# Patient Record
Sex: FEMALE | URBAN - METROPOLITAN AREA
[De-identification: names, ages, dates, MRNs, and addresses within clinical notes are randomized per-mention and may not be internally consistent; named-entity substitution may affect disease eponyms.]

---

## 2023-05-03 LAB
HEPATITIS B VIRUS SURFACE AB (MIU/ML) IN SERUM: <3.1 MIU/ML
MUMPS IGG ANTIBODY: POSITIVE
RUBELLA VIRUS IGG AB: POSITIVE
RUBEOLA IGG ANTIBODY: POSITIVE
SYPHILIS TOTAL AB: NONREACTIVE
VARICELLA ZOSTER IGG: POSITIVE

## 2023-05-05 LAB
NIL(NEG) CONTROL SPOT COUNT: NORMAL
PANEL A SPOT COUNT: 0
PANEL B SPOT COUNT: 0
POS CONTROL SPOT COUNT: NORMAL
T-SPOT. TB INTERPRETATION: NEGATIVE

## 2023-05-09 ENCOUNTER — APPOINTMENT (OUTPATIENT)
Dept: PRIMARY CARE | Facility: CLINIC | Age: 28
End: 2023-05-09
Payer: COMMERCIAL

## 2023-05-09 ENCOUNTER — CLINICAL SUPPORT (OUTPATIENT)
Dept: PRIMARY CARE | Facility: CLINIC | Age: 28
End: 2023-05-09
Payer: COMMERCIAL

## 2023-05-09 DIAGNOSIS — Z23 ENCOUNTER FOR IMMUNIZATION: Primary | ICD-10-CM

## 2023-05-09 PROCEDURE — 90472 IMMUNIZATION ADMIN EACH ADD: CPT | Performed by: INTERNAL MEDICINE

## 2023-05-09 PROCEDURE — 90471 IMMUNIZATION ADMIN: CPT | Performed by: INTERNAL MEDICINE

## 2023-05-09 PROCEDURE — 90743 HEPB VACC 2 DOSE ADOLESC IM: CPT | Performed by: INTERNAL MEDICINE

## 2023-05-09 PROCEDURE — 90715 TDAP VACCINE 7 YRS/> IM: CPT | Performed by: INTERNAL MEDICINE

## 2023-09-23 ENCOUNTER — LAB (OUTPATIENT)
Dept: LAB | Facility: LAB | Age: 28
End: 2023-09-23

## 2023-09-24 LAB — TOBACCO SCREEN, URINE: NEGATIVE

## 2023-10-02 ENCOUNTER — INITIAL PRENATAL (OUTPATIENT)
Dept: OBSTETRICS AND GYNECOLOGY | Facility: CLINIC | Age: 28
End: 2023-10-02
Payer: COMMERCIAL

## 2023-10-02 VITALS — DIASTOLIC BLOOD PRESSURE: 65 MMHG | SYSTOLIC BLOOD PRESSURE: 105 MMHG | WEIGHT: 123 LBS | BODY MASS INDEX: 21.79 KG/M2

## 2023-10-02 DIAGNOSIS — Z34.01 FIRST PREGNANCY, FIRST TRIMESTER (HHS-HCC): Primary | ICD-10-CM

## 2023-10-02 LAB — PREGNANCY TEST URINE, POC: POSITIVE

## 2023-10-02 PROCEDURE — 87800 DETECT AGNT MULT DNA DIREC: CPT | Performed by: NURSE PRACTITIONER

## 2023-10-02 PROCEDURE — 81025 URINE PREGNANCY TEST: CPT | Performed by: NURSE PRACTITIONER

## 2023-10-02 PROCEDURE — 0500F INITIAL PRENATAL CARE VISIT: CPT | Performed by: NURSE PRACTITIONER

## 2023-10-02 PROCEDURE — 88175 CYTOPATH C/V AUTO FLUID REDO: CPT | Mod: TC,GCY | Performed by: NURSE PRACTITIONER

## 2023-10-02 PROCEDURE — 87086 URINE CULTURE/COLONY COUNT: CPT | Performed by: NURSE PRACTITIONER

## 2023-10-02 RX ORDER — KETOCONAZOLE 20 MG/ML
SHAMPOO, SUSPENSION TOPICAL AS NEEDED
COMMUNITY
Start: 2022-11-11 | End: 2023-11-16

## 2023-10-02 RX ORDER — CLOBETASOL PROPIONATE 0.46 MG/ML
SOLUTION TOPICAL AS NEEDED
COMMUNITY
Start: 2022-11-11 | End: 2023-11-16

## 2023-10-02 ASSESSMENT — ENCOUNTER SYMPTOMS: FATIGUE: 1

## 2023-10-02 NOTE — PROGRESS NOTES
Subjective   Patient ID: Cara Quezada is a 28 y.o. female who presents for New Patient Visit (Confirmation of pregnancy).  HPI    Review of Systems   Constitutional:  Positive for fatigue.   Genitourinary:         Nausea   All other systems reviewed and are negative.      Objective   Physical Exam  Cardiovascular:      Rate and Rhythm: Normal rate and regular rhythm.   Pulmonary:      Effort: Pulmonary effort is normal.      Breath sounds: Normal breath sounds.   Genitourinary:     General: Normal vulva.      Vagina: Normal.      Cervix: Normal.      Uterus: Normal.       Adnexa: Right adnexa normal.      Rectum: Normal.   Neurological:      Mental Status: She is alert.         Assessment/Plan   Problem List Items Addressed This Visit    None  Visit Diagnoses         Codes    First pregnancy, first trimester    -  Primary Z34.01    Relevant Orders    CBC Anemia Panel With Reflex,Pregnancy    Hepatitis B Surface Antigen    HIV 1/2 Antigen/Antibody Screen with Reflex to Confirmation    Rubella Antibody, IgG    Syphilis Screen with Reflex    THINPREP PAP TEST    Type And Screen    Urine Culture    Vitamin D 25-Hydroxy,Total (for eval of Vitamin D levels)    US MAC OB imaging order    Follow Up In Obstetrics          New OB labs ordered.  All patient questions answered.  Home remedies to help with nausea discussed.  Return in 4 weeks for new OB visit.

## 2023-10-03 LAB — BACTERIA UR CULT: NO GROWTH

## 2023-10-04 LAB
C TRACH RRNA SPEC QL NAA+PROBE: NEGATIVE
N GONORRHOEA DNA SPEC QL PROBE+SIG AMP: NEGATIVE

## 2023-10-17 ENCOUNTER — LAB (OUTPATIENT)
Dept: LAB | Facility: LAB | Age: 28
End: 2023-10-17
Payer: COMMERCIAL

## 2023-10-17 DIAGNOSIS — Z34.01 FIRST PREGNANCY, FIRST TRIMESTER (HHS-HCC): ICD-10-CM

## 2023-10-17 LAB
ABO GROUP (TYPE) IN BLOOD: NORMAL
ANTIBODY SCREEN: NORMAL
CYTOLOGY CMNT CVX/VAG CYTO-IMP: NORMAL
ERYTHROCYTE [DISTWIDTH] IN BLOOD BY AUTOMATED COUNT: 12.3 % (ref 11.5–14.5)
HCT VFR BLD AUTO: 37.6 % (ref 36–46)
HGB BLD-MCNC: 12.8 G/DL (ref 12–16)
LAB AP HPV GENOTYPE QUESTION: NO
LAB AP HPV HR: NORMAL
LAB AP PAP ADDITIONAL TESTS: NORMAL
LABORATORY COMMENT REPORT: NORMAL
LMP START DATE: NORMAL
MCH RBC QN AUTO: 28.1 PG (ref 26–34)
MCHC RBC AUTO-ENTMCNC: 34 G/DL (ref 32–36)
MCV RBC AUTO: 83 FL (ref 80–100)
MENSTRUAL HX REPORTED: NORMAL
NRBC BLD-RTO: 0 /100 WBCS (ref 0–0)
PATH REPORT.TOTAL CANCER: NORMAL
PLATELET # BLD AUTO: 239 X10*3/UL (ref 150–450)
PMV BLD AUTO: 10.9 FL (ref 7.5–11.5)
RBC # BLD AUTO: 4.55 X10*6/UL (ref 4–5.2)
RH FACTOR (ANTIGEN D): NORMAL
WBC # BLD AUTO: 9.6 X10*3/UL (ref 4.4–11.3)

## 2023-10-17 PROCEDURE — 36415 COLL VENOUS BLD VENIPUNCTURE: CPT

## 2023-10-17 PROCEDURE — 82306 VITAMIN D 25 HYDROXY: CPT

## 2023-10-17 PROCEDURE — 85027 COMPLETE CBC AUTOMATED: CPT

## 2023-10-17 PROCEDURE — 86780 TREPONEMA PALLIDUM: CPT

## 2023-10-17 PROCEDURE — 86901 BLOOD TYPING SEROLOGIC RH(D): CPT

## 2023-10-17 PROCEDURE — 87340 HEPATITIS B SURFACE AG IA: CPT

## 2023-10-17 PROCEDURE — 86317 IMMUNOASSAY INFECTIOUS AGENT: CPT

## 2023-10-17 PROCEDURE — 86850 RBC ANTIBODY SCREEN: CPT

## 2023-10-17 PROCEDURE — 87389 HIV-1 AG W/HIV-1&-2 AB AG IA: CPT

## 2023-10-17 PROCEDURE — 86900 BLOOD TYPING SEROLOGIC ABO: CPT

## 2023-10-18 LAB
25(OH)D3 SERPL-MCNC: 30 NG/ML (ref 30–100)
HBV SURFACE AG SERPL QL IA: NONREACTIVE
HIV 1+2 AB+HIV1 P24 AG SERPL QL IA: NONREACTIVE
REFLEX ADDED, ANEMIA PANEL: NORMAL
RUBV IGG SERPL IA-ACNC: 1.6 IA
RUBV IGG SERPL QL IA: POSITIVE
T PALLIDUM AB SER QL: NONREACTIVE

## 2023-10-26 ENCOUNTER — APPOINTMENT (OUTPATIENT)
Dept: OBSTETRICS AND GYNECOLOGY | Facility: CLINIC | Age: 28
End: 2023-10-26
Payer: COMMERCIAL

## 2023-11-01 ENCOUNTER — APPOINTMENT (OUTPATIENT)
Dept: RADIOLOGY | Facility: CLINIC | Age: 28
End: 2023-11-01
Payer: COMMERCIAL

## 2023-11-02 ENCOUNTER — ANCILLARY PROCEDURE (OUTPATIENT)
Dept: RADIOLOGY | Facility: CLINIC | Age: 28
End: 2023-11-02
Payer: COMMERCIAL

## 2023-11-02 DIAGNOSIS — O26.891 OTHER SPECIFIED PREGNANCY RELATED CONDITIONS, FIRST TRIMESTER (HHS-HCC): ICD-10-CM

## 2023-11-02 DIAGNOSIS — Z34.01 FIRST PREGNANCY, FIRST TRIMESTER (HHS-HCC): ICD-10-CM

## 2023-11-02 PROCEDURE — 76801 OB US < 14 WKS SINGLE FETUS: CPT

## 2023-11-02 PROCEDURE — 76815 OB US LIMITED FETUS(S): CPT | Performed by: OBSTETRICS & GYNECOLOGY

## 2023-11-16 ENCOUNTER — APPOINTMENT (OUTPATIENT)
Dept: OBSTETRICS AND GYNECOLOGY | Facility: CLINIC | Age: 28
End: 2023-11-16

## 2023-11-16 ENCOUNTER — ROUTINE PRENATAL (OUTPATIENT)
Dept: OBSTETRICS AND GYNECOLOGY | Facility: CLINIC | Age: 28
End: 2023-11-16

## 2023-11-16 ENCOUNTER — LAB (OUTPATIENT)
Dept: LAB | Facility: LAB | Age: 28
End: 2023-11-16

## 2023-11-16 VITALS — WEIGHT: 128 LBS | SYSTOLIC BLOOD PRESSURE: 106 MMHG | BODY MASS INDEX: 22.67 KG/M2 | DIASTOLIC BLOOD PRESSURE: 64 MMHG

## 2023-11-16 DIAGNOSIS — Z34.01 FIRST PREGNANCY, FIRST TRIMESTER (HHS-HCC): ICD-10-CM

## 2023-11-16 DIAGNOSIS — Z34.01 FIRST PREGNANCY, FIRST TRIMESTER (HHS-HCC): Primary | ICD-10-CM

## 2023-11-16 PROCEDURE — 90686 IIV4 VACC NO PRSV 0.5 ML IM: CPT | Performed by: OBSTETRICS & GYNECOLOGY

## 2023-11-16 PROCEDURE — 90471 IMMUNIZATION ADMIN: CPT | Performed by: OBSTETRICS & GYNECOLOGY

## 2023-11-16 PROCEDURE — 0501F PRENATAL FLOW SHEET: CPT | Performed by: OBSTETRICS & GYNECOLOGY

## 2023-11-16 PROCEDURE — 36415 COLL VENOUS BLD VENIPUNCTURE: CPT

## 2023-11-16 NOTE — PROGRESS NOTES
Subjective   Patient ID [id number]   Cara Quezada is a 28 y.o.  at 15w1d with a working estimated date of delivery of 2024, by Last Menstrual Period who presents for a routine prenatal visit. She denies vaginal bleeding, leakage of fluid, decreased fetal movements, or contractions.  She previously saw Zandra Valenzuela for her initial OB visit.    Her pregnancy is complicated by:  Desires cfDNA screen    Objective   Physical Exam  Weight: 58.1 kg (128 lb)  Expected Total Weight Gain: Could not be calculated   Pregravid BMI: Could not be calculated  BP: 106/64  Fetal Heart Rate: 152            Lab Results   Component Value Date    HGB 12.8 10/17/2023    HCT 37.6 10/17/2023    ABO O 10/17/2023    HEPBSAG Nonreactive 10/17/2023         Assessment/Plan     Continue prenatal vitamin.  Flu vaccine  Follow up in 2 weeks for a routine prenatal visit.

## 2023-11-28 ENCOUNTER — PATIENT MESSAGE (OUTPATIENT)
Dept: OBSTETRICS AND GYNECOLOGY | Facility: HOSPITAL | Age: 28
End: 2023-11-28

## 2023-11-29 NOTE — TELEPHONE ENCOUNTER
From: Cara Quezada  To: Zandra Valenzuela, APRN-CNP  Sent: 11/28/2023 8:42 PM EST  Subject: Pregnancy Question    Hi, I wanted to know if it would be safe for [place] while I am pregnant next month. I am due May 8th and would be flying in late December until early January? Thanks.

## 2023-12-13 ENCOUNTER — APPOINTMENT (OUTPATIENT)
Dept: RADIOLOGY | Facility: CLINIC | Age: 28
End: 2023-12-13
Payer: COMMERCIAL

## 2023-12-13 PROBLEM — Z34.92: Status: ACTIVE | Noted: 2023-12-13

## 2023-12-14 ENCOUNTER — ANCILLARY PROCEDURE (OUTPATIENT)
Dept: RADIOLOGY | Facility: CLINIC | Age: 28
End: 2023-12-14
Payer: COMMERCIAL

## 2023-12-14 ENCOUNTER — ROUTINE PRENATAL (OUTPATIENT)
Dept: OBSTETRICS AND GYNECOLOGY | Facility: CLINIC | Age: 28
End: 2023-12-14
Payer: COMMERCIAL

## 2023-12-14 VITALS — SYSTOLIC BLOOD PRESSURE: 108 MMHG | BODY MASS INDEX: 22.85 KG/M2 | WEIGHT: 129 LBS | DIASTOLIC BLOOD PRESSURE: 68 MMHG

## 2023-12-14 DIAGNOSIS — Z34.92 ENCOUNTER FOR PREGNANCY RELATED EXAMINATION IN SECOND TRIMESTER (HHS-HCC): Primary | ICD-10-CM

## 2023-12-14 DIAGNOSIS — Z32.01 PREGNANCY TEST POSITIVE (HHS-HCC): ICD-10-CM

## 2023-12-14 PROCEDURE — 76811 OB US DETAILED SNGL FETUS: CPT | Performed by: OBSTETRICS & GYNECOLOGY

## 2023-12-14 PROCEDURE — 0501F PRENATAL FLOW SHEET: CPT | Performed by: OBSTETRICS & GYNECOLOGY

## 2023-12-14 PROCEDURE — 76811 OB US DETAILED SNGL FETUS: CPT

## 2024-01-11 ENCOUNTER — ROUTINE PRENATAL (OUTPATIENT)
Dept: OBSTETRICS AND GYNECOLOGY | Facility: CLINIC | Age: 29
End: 2024-01-11
Payer: COMMERCIAL

## 2024-01-11 ENCOUNTER — ANCILLARY PROCEDURE (OUTPATIENT)
Dept: RADIOLOGY | Facility: CLINIC | Age: 29
End: 2024-01-11
Payer: COMMERCIAL

## 2024-01-11 VITALS — DIASTOLIC BLOOD PRESSURE: 66 MMHG | WEIGHT: 131 LBS | SYSTOLIC BLOOD PRESSURE: 101 MMHG | BODY MASS INDEX: 23.21 KG/M2

## 2024-01-11 DIAGNOSIS — Z34.90 PREGNANT (HHS-HCC): ICD-10-CM

## 2024-01-11 DIAGNOSIS — Z34.92 ENCOUNTER FOR PREGNANCY RELATED EXAMINATION IN SECOND TRIMESTER (HHS-HCC): Primary | ICD-10-CM

## 2024-01-11 PROCEDURE — 0501F PRENATAL FLOW SHEET: CPT | Performed by: OBSTETRICS & GYNECOLOGY

## 2024-01-11 PROCEDURE — 76816 OB US FOLLOW-UP PER FETUS: CPT | Performed by: OBSTETRICS & GYNECOLOGY

## 2024-01-11 PROCEDURE — 76816 OB US FOLLOW-UP PER FETUS: CPT

## 2024-01-11 NOTE — PROGRESS NOTES
Ob Follow-up    SUBJECTIVE    HPI: Cara Quezada is a 28 y.o.  at 23w1d here for RPNV.  She denies vaginal bleeding, leakage of fluid, decreased fetal movements, and contractions.     OBJECTIVE  Visit Vitals  /66   Wt 59.4 kg (131 lb)   LMP 2023 (Exact Date)   BMI 23.21 kg/m²   OB Status Pregnant   Smoking Status Never   BSA 1.62 m²      See OB flowsheet  Incomplete anatomy US, follow up scheduled today    ASSESSMENT & PLAN    Cara Quezada is a 28 y.o.  at 23w1d here for the following concerns we addressed today:    Problem List Items Addressed This Visit          Ob-Gyn Problems    Encounter for pregnancy related examination in second trimester - Primary    Overview     Dating:   [x] Initial BMI:  21  [x] Prenatal Labs:  Reviewed  [x] Genetic Screening:  rrcfDNA screen  [x] Baby ASA:  Yes  [x] Anatomy US: Incomplete  [] 1hr GCT at 24-28wks:  [] Tdap (27-36wks):  [x] Flu Shot:  Done  [] COVID vaccine:   [] Rhogam (if Rh neg):   [] GBS at 36 wks:  [] Breastfeeding  [] Postpartum Birth control method:   [] 39 weeks discussion of IOL vs. Expectant management:  [] Mode of delivery:            Relevant Orders    CBC Anemia Panel With Reflex,Pregnancy    Glucose, 1 Hour Screen, Pregnancy    Syphilis Screen with Reflex         GCT next visit  RTC in 4 weeks      Mike Masters MD

## 2024-01-24 ENCOUNTER — PATIENT MESSAGE (OUTPATIENT)
Dept: OBSTETRICS AND GYNECOLOGY | Facility: CLINIC | Age: 29
End: 2024-01-24
Payer: COMMERCIAL

## 2024-01-24 NOTE — TELEPHONE ENCOUNTER
From: Cara Quezada  To: Mike Masters MD  Sent: 1/24/2024 12:38 PM EST  Subject: Antacid question     Hi Dr. Masters,    I wanted to know what else I can take besides tums for my heartburn? It is getting really bad and one of my friends said she takes something called famotedine and I was curious if there is something else I can try? Thanks!

## 2024-02-06 NOTE — PROGRESS NOTES
Ob Follow-up    SUBJECTIVE    HPI: Cara Quezada is a 28 y.o.  at 27w1d here for RPNV.  She denies vaginal bleeding, leakage of fluid, decreased fetal movements, and contractions.     OBJECTIVE  Visit Vitals  /71   Wt 60.3 kg (133 lb)   LMP 2023 (Exact Date)   BMI 23.56 kg/m²   OB Status Pregnant   Smoking Status Never   BSA 1.64 m²      See OB flowsheet  Incomplete anatomy US, follow up scheduled today    ASSESSMENT & PLAN    Cara Quezada is a 28 y.o.  at 27w1d here for the following concerns we addressed today:    Problem List Items Addressed This Visit          Ob-Gyn Problems    Encounter for pregnancy related examination in second trimester    Overview     Dating:   [x] Initial BMI:  21  [x] Prenatal Labs:  Reviewed  [x] Genetic Screening:  rrcfDNA screen  [x] Baby ASA:  Yes  [x] Anatomy US: Incomplete  [] 1hr GCT at 24-28wks:  [] Tdap (27-36wks):  [x] Flu Shot:  Done  [x] Rhogam (if Rh neg):  O positive  [] GBS at 36 wks:  [] Breastfeeding  [] Postpartum Birth control method:   [x] 39 weeks discussion of IOL vs. Expectant management:  Discussed ARRIVE trial  [x] Mode of delivery: Vaginal            Other Visit Diagnoses       Gastroesophageal reflux disease without esophagitis    -  Primary    Relevant Medications    omeprazole (PriLOSEC) 20 mg DR capsule          GCT today, tDap next visit  Planning unmedicated birth, recommend classes, hypnobirthing etc.  RTC in 4 weeks    Mike Masters MD

## 2024-02-08 ENCOUNTER — ROUTINE PRENATAL (OUTPATIENT)
Dept: OBSTETRICS AND GYNECOLOGY | Facility: CLINIC | Age: 29
End: 2024-02-08
Payer: COMMERCIAL

## 2024-02-08 VITALS — DIASTOLIC BLOOD PRESSURE: 71 MMHG | BODY MASS INDEX: 23.56 KG/M2 | SYSTOLIC BLOOD PRESSURE: 101 MMHG | WEIGHT: 133 LBS

## 2024-02-08 DIAGNOSIS — K21.9 GASTROESOPHAGEAL REFLUX DISEASE WITHOUT ESOPHAGITIS: Primary | ICD-10-CM

## 2024-02-08 DIAGNOSIS — Z34.92 ENCOUNTER FOR PREGNANCY RELATED EXAMINATION IN SECOND TRIMESTER (HHS-HCC): ICD-10-CM

## 2024-02-08 LAB
ERYTHROCYTE [DISTWIDTH] IN BLOOD BY AUTOMATED COUNT: 12.9 % (ref 11.5–14.5)
GLUCOSE 1H P 50 G GLC PO SERPL-MCNC: 89 MG/DL
HCT VFR BLD AUTO: 39.5 % (ref 36–46)
HGB BLD-MCNC: 12.9 G/DL (ref 12–16)
MCH RBC QN AUTO: 28.9 PG (ref 26–34)
MCHC RBC AUTO-ENTMCNC: 32.7 G/DL (ref 32–36)
MCV RBC AUTO: 89 FL (ref 80–100)
NRBC BLD-RTO: 0 /100 WBCS (ref 0–0)
PLATELET # BLD AUTO: 247 X10*3/UL (ref 150–450)
RBC # BLD AUTO: 4.46 X10*6/UL (ref 4–5.2)
REFLEX ADDED, ANEMIA PANEL: NORMAL
WBC # BLD AUTO: 9.9 X10*3/UL (ref 4.4–11.3)

## 2024-02-08 PROCEDURE — 0501F PRENATAL FLOW SHEET: CPT | Performed by: OBSTETRICS & GYNECOLOGY

## 2024-02-08 PROCEDURE — 82947 ASSAY GLUCOSE BLOOD QUANT: CPT

## 2024-02-08 PROCEDURE — 85027 COMPLETE CBC AUTOMATED: CPT

## 2024-02-08 PROCEDURE — 86780 TREPONEMA PALLIDUM: CPT

## 2024-02-08 PROCEDURE — 36415 COLL VENOUS BLD VENIPUNCTURE: CPT

## 2024-02-08 RX ORDER — OMEPRAZOLE 20 MG/1
20 CAPSULE, DELAYED RELEASE ORAL DAILY
Qty: 90 CAPSULE | Refills: 1 | Status: SHIPPED | OUTPATIENT
Start: 2024-02-08 | End: 2025-02-07

## 2024-02-08 RX ORDER — OMEPRAZOLE 20 MG/1
20 CAPSULE, DELAYED RELEASE ORAL DAILY
Qty: 90 CAPSULE | Refills: 1 | Status: SHIPPED | OUTPATIENT
Start: 2024-02-08 | End: 2024-02-08 | Stop reason: SDUPTHER

## 2024-02-08 ASSESSMENT — EDINBURGH POSTNATAL DEPRESSION SCALE (EPDS)
I HAVE FELT SCARED OR PANICKY FOR NO GOOD REASON: NO, NOT AT ALL
I HAVE BEEN SO UNHAPPY THAT I HAVE BEEN CRYING: NO, NEVER
I HAVE LOOKED FORWARD WITH ENJOYMENT TO THINGS: AS MUCH AS I EVER DID
TOTAL SCORE: 0
I HAVE BEEN ABLE TO LAUGH AND SEE THE FUNNY SIDE OF THINGS: AS MUCH AS I ALWAYS COULD
I HAVE BEEN ANXIOUS OR WORRIED FOR NO GOOD REASON: NO, NOT AT ALL
I HAVE BLAMED MYSELF UNNECESSARILY WHEN THINGS WENT WRONG: NO, NEVER
THE THOUGHT OF HARMING MYSELF HAS OCCURRED TO ME: NEVER
I HAVE BEEN SO UNHAPPY THAT I HAVE HAD DIFFICULTY SLEEPING: NOT AT ALL
THINGS HAVE BEEN GETTING ON TOP OF ME: NO, I HAVE BEEN COPING AS WELL AS EVER
I HAVE FELT SAD OR MISERABLE: NO, NOT AT ALL

## 2024-02-09 LAB — TREPONEMA PALLIDUM IGG+IGM AB [PRESENCE] IN SERUM OR PLASMA BY IMMUNOASSAY: NONREACTIVE

## 2024-02-29 ENCOUNTER — TELEPHONE (OUTPATIENT)
Dept: OBSTETRICS AND GYNECOLOGY | Facility: CLINIC | Age: 29
End: 2024-02-29
Payer: COMMERCIAL

## 2024-02-29 ENCOUNTER — HOSPITAL ENCOUNTER (OUTPATIENT)
Facility: HOSPITAL | Age: 29
End: 2024-02-29
Attending: OBSTETRICS & GYNECOLOGY | Admitting: OBSTETRICS & GYNECOLOGY
Payer: COMMERCIAL

## 2024-02-29 ENCOUNTER — HOSPITAL ENCOUNTER (OUTPATIENT)
Facility: HOSPITAL | Age: 29
Discharge: HOME | End: 2024-02-29
Attending: OBSTETRICS & GYNECOLOGY | Admitting: OBSTETRICS & GYNECOLOGY
Payer: COMMERCIAL

## 2024-02-29 VITALS
HEIGHT: 63 IN | RESPIRATION RATE: 17 BRPM | SYSTOLIC BLOOD PRESSURE: 100 MMHG | DIASTOLIC BLOOD PRESSURE: 61 MMHG | OXYGEN SATURATION: 97 % | WEIGHT: 144.4 LBS | BODY MASS INDEX: 25.59 KG/M2 | TEMPERATURE: 98.4 F | HEART RATE: 81 BPM

## 2024-02-29 PROCEDURE — 99214 OFFICE O/P EST MOD 30 MIN: CPT

## 2024-02-29 PROCEDURE — 59025 FETAL NON-STRESS TEST: CPT

## 2024-02-29 SDOH — SOCIAL STABILITY: SOCIAL INSECURITY: VERBAL ABUSE: DENIES

## 2024-02-29 SDOH — HEALTH STABILITY: MENTAL HEALTH: WISH TO BE DEAD (PAST 1 MONTH): NO

## 2024-02-29 SDOH — HEALTH STABILITY: MENTAL HEALTH: NON-SPECIFIC ACTIVE SUICIDAL THOUGHTS (PAST 1 MONTH): NO

## 2024-02-29 SDOH — HEALTH STABILITY: MENTAL HEALTH: WERE YOU ABLE TO COMPLETE ALL THE BEHAVIORAL HEALTH SCREENINGS?: YES

## 2024-02-29 SDOH — SOCIAL STABILITY: SOCIAL INSECURITY: PHYSICAL ABUSE: DENIES

## 2024-02-29 SDOH — SOCIAL STABILITY: SOCIAL INSECURITY: ARE YOU OR HAVE YOU BEEN THREATENED OR ABUSED PHYSICALLY, EMOTIONALLY, OR SEXUALLY BY ANYONE?: NO

## 2024-02-29 SDOH — SOCIAL STABILITY: SOCIAL INSECURITY: HAVE YOU HAD THOUGHTS OF HARMING ANYONE ELSE?: NO

## 2024-02-29 SDOH — ECONOMIC STABILITY: HOUSING INSECURITY: DO YOU FEEL UNSAFE GOING BACK TO THE PLACE WHERE YOU ARE LIVING?: NO

## 2024-02-29 SDOH — SOCIAL STABILITY: SOCIAL INSECURITY: ARE THERE ANY APPARENT SIGNS OF INJURIES/BEHAVIORS THAT COULD BE RELATED TO ABUSE/NEGLECT?: NO

## 2024-02-29 SDOH — SOCIAL STABILITY: SOCIAL INSECURITY: ABUSE SCREEN: ADULT

## 2024-02-29 SDOH — HEALTH STABILITY: MENTAL HEALTH: SUICIDAL BEHAVIOR (LIFETIME): NO

## 2024-02-29 SDOH — SOCIAL STABILITY: SOCIAL INSECURITY: HAS ANYONE EVER THREATENED TO HURT YOUR FAMILY OR YOUR PETS?: NO

## 2024-02-29 SDOH — SOCIAL STABILITY: SOCIAL INSECURITY: DO YOU FEEL ANYONE HAS EXPLOITED OR TAKEN ADVANTAGE OF YOU FINANCIALLY OR OF YOUR PERSONAL PROPERTY?: NO

## 2024-02-29 SDOH — HEALTH STABILITY: MENTAL HEALTH: HAVE YOU USED ANY SUBSTANCES (CANABIS, COCAINE, HEROIN, HALLUCINOGENS, INHALANTS, ETC.) IN THE PAST 12 MONTHS?: NO

## 2024-02-29 SDOH — HEALTH STABILITY: MENTAL HEALTH: HAVE YOU USED ANY PRESCRIPTION DRUGS OTHER THAN PRESCRIBED IN THE PAST 12 MONTHS?: NO

## 2024-02-29 SDOH — SOCIAL STABILITY: SOCIAL INSECURITY: DOES ANYONE TRY TO KEEP YOU FROM HAVING/CONTACTING OTHER FRIENDS OR DOING THINGS OUTSIDE YOUR HOME?: NO

## 2024-02-29 ASSESSMENT — LIFESTYLE VARIABLES
HOW OFTEN DO YOU HAVE 6 OR MORE DRINKS ON ONE OCCASION: NEVER
SKIP TO QUESTIONS 9-10: 1
HOW MANY STANDARD DRINKS CONTAINING ALCOHOL DO YOU HAVE ON A TYPICAL DAY: PATIENT DOES NOT DRINK
AUDIT-C TOTAL SCORE: 0
AUDIT-C TOTAL SCORE: 0
HOW OFTEN DO YOU HAVE A DRINK CONTAINING ALCOHOL: NEVER

## 2024-02-29 ASSESSMENT — PATIENT HEALTH QUESTIONNAIRE - PHQ9
2. FEELING DOWN, DEPRESSED OR HOPELESS: NOT AT ALL
SUM OF ALL RESPONSES TO PHQ9 QUESTIONS 1 & 2: 0
1. LITTLE INTEREST OR PLEASURE IN DOING THINGS: NOT AT ALL

## 2024-02-29 NOTE — TELEPHONE ENCOUNTER
Regarding: Baby kick frequency  Contact: [phone]  ----- Message from Arabella Romano MA sent at 2/29/2024  8:29 AM EST -----       ----- Message from Cara Quezada to Mike Masters MD sent at 2/29/2024  8:28 AM -----   Hi Dr. Masters,    I wanted to ask if it is normal that i have felt less kicks, both frequency and strength over the past couple days.  I do feel kicking, just not the same as it was?

## 2024-02-29 NOTE — TELEPHONE ENCOUNTER
contacted pt  name and  verified  Pt's  called in due to wife being at work  They are reporting decreased fetal movement in both strength and frequency  No other symptoms of loss of fluid or bleeding reported  Pt advised to go to L&D for evaluation at [facility] due to gestational age  Awaiting call from pt for more information        contacted pt  name and  verified  Spoke with pt states that she is having decreased movement in strength and frequency  No loss of fluid, odor or bleeding, some mild cramping  Gave pt the recommendation to go to [facility] and Delivery to get evaluated  pt verbalized understanding  no further questions or concerns at this time

## 2024-02-29 NOTE — TELEPHONE ENCOUNTER
contacted pt  name and  verified  Spoke with pt states that she is having decreased movement in strength and frequency  No loss of fluid, odor or bleeding, some mild cramping  Gave pt the recommendation to go to main campus Labor and Delivery to get evaluated  pt verbalized understanding  no further questions or concerns at this time

## 2024-02-29 NOTE — TELEPHONE ENCOUNTER
contacted pt  name and  verified  Pt's  called in due to wife being at work  They are reporting decreased fetal movement in both strength and frequency  No other symptoms of loss of fluid or bleeding reported  Pt advised to go to L&D for evaluation at [facility] due to gestational age  Awaiting call from pt for more information

## 2024-03-01 NOTE — H&P
Obstetrical Triage History and Physical     Nofar Pilar is a 28 y.o. . 30w1d seen in triage for dFM.     Chief Complaint: Decreased fetal movement    Assessment/Plan    Decreased FM  - Has been feeling fetal movement but decrease in intensity  - Has felt FM since arriving to triage  - NST appropriate for gestational age, reassuring tracing  - Education provided regarding monitoring FM  - Discussed that cEFM is reassuring regarding fetal well-being  - Return precautions reviewed    IUP at 30w1d  - NST appropriate for gestational age   - Continue routine prenatal care  - Next appt  with Dr. Masters    Maternal Well-being  - Vital signs stable and WNL  - All questions and concerns addressed    Dispo:  - appropriate for discharge to home, patient comfortable with this   - follow-up with Dr. Masters on  for routine prenatal visit   - return precautions reviewed     Discussed with Dr. Braxton, tracing reviewed, agrees with plan to d/c home.   DAVION LemusC        Active Problems:  There are no active Hospital Problems.      Pregnancy Problems (from 10/02/23 to present)       Problem Noted Resolved    Encounter for pregnancy related examination in second trimester 2023 by Mike Masters MD No    Priority:  Medium      Overview Addendum 2024 12:22 PM by Mike Masters MD     Dating:   [x] Initial BMI:  21  [x] Prenatal Labs:  Reviewed  [x] Genetic Screening:  rrcfDNA screen  [x] Baby ASA:  Yes  [x] Anatomy US: Incomplete  [x] 1hr GCT at 24-28wks: Normal  [] Tdap (27-36wks):  [x] Flu Shot:  Done  [x] Rhogam (if Rh neg):  O positive  [] GBS at 36 wks:  [] Breastfeeding  [] Postpartum Birth control method:   [x] 39 weeks discussion of IOL vs. Expectant management:  Discussed ARRIVE trial  [x] Mode of delivery: Vaginal                 Subjective   Nofar is here complaining of decreased fetal movement.  Denies vaginal bleeding., Denies contractions., Denies leaking of fluid.      Patient states for the  past few days she has felt a change in FM. She states the movements feel softer and less intense. She states she does not know how to evaluate fetal movement at home or if/how to do kick counts. Would like guidance regarding monitoring FM at home. She endorses FM since arriving to triage. She denies other concerns.     Obstetrical History   OB History    Para Term  AB Living   1 0           SAB IAB Ectopic Multiple Live Births                  # Outcome Date GA Lbr Anthony/2nd Weight Sex Delivery Anes PTL Lv   1 Current                Past Medical History  Past Medical History:   Diagnosis Date    Psoriasis         Past Surgical History   Past Surgical History:   Procedure Laterality Date    APPENDECTOMY      OTHER SURGICAL HISTORY  12/15/2022    Appendectomy       Social History  Social History     Tobacco Use    Smoking status: Never    Smokeless tobacco: Never   Substance Use Topics    Alcohol use: Never     Substance and Sexual Activity   Drug Use Never       Allergies  Patient has no known allergies.     Medications  Medications Prior to Admission   Medication Sig Dispense Refill Last Dose    omeprazole (PriLOSEC) 20 mg DR capsule Take 1 capsule (20 mg) by mouth once daily. Do not crush or chew. 90 capsule 1     PRENATAL 2-IRON-FOLIC ACID-OM3 ORAL Take by mouth.          Objective    Last Vitals  Temp Pulse Resp BP MAP O2 Sat   36.9 °C (98.4 °F) 83 16 110/61   97 %     Physical Examination  GENERAL: Examination reveals a well developed, well nourished, gravid female in no acute distress. She is alert and cooperative.  LUNGS: Normal respiratory effort  ABDOMEN: soft, gravid, nontender, nondistended, no abnormal masses, no epigastric pain  NEUROLOGICAL: alert, oriented, normal speech, no focal findings or movement disorder noted  PSYCHOLOGICAL: awake and alert; oriented to person, place, and time    Non-Stress Test   Baseline Fetal Heart Rate for Non-Stress Test: 135 BPM  Variability in Waveform for  Non-Stress Test: Moderate  Accelerations in Non-Stress Test: Yes, greater than/equal to 15 bpm, lasting at least 15 seconds  Decelerations in Non-Stress Test: None  Contractions in Non-Stress Test: Not present (rare irritability)  Acoustic Stimulator for Non-Stress Test: No  Interpretation of Non-Stress Test   Interpretation of Non-Stress Test: Reactive     Lab Review  Labs in chart were reviewed.

## 2024-03-06 NOTE — PROGRESS NOTES
Ob Follow-up    SUBJECTIVE    HPI: Cara Quezada is a 28 y.o.  at 31w1d here for RPNV.  She denies vaginal bleeding, leakage of fluid, decreased fetal movements, and contractions.  Seen in triage for decreased fetal movement since last visit with reassuring testing.     OBJECTIVE  Visit Vitals  /67   Wt 64 kg (141 lb)   LMP 2023 (Exact Date)   BMI 24.98 kg/m²   OB Status Pregnant   Smoking Status Never   BSA 1.69 m²      See OB flowsheet    ASSESSMENT & PLAN    Cara Quezada is a 28 y.o.  at 31w1d here for the following concerns we addressed today:    Problem List Items Addressed This Visit          Ob-Gyn Problems    Encounter for pregnancy related examination in second trimester - Primary    Overview     Dating:   [x] Initial BMI:  21  [x] Prenatal Labs:  Reviewed  [x] Genetic Screening:  rrcfDNA screen  [x] Baby ASA:  Yes  [x] Anatomy US: Normal  [x] 1hr GCT at 24-28wks: Normal  [x] Tdap (27-36wks):  Done  [x] Flu Shot:  Done  [x] Rhogam (if Rh neg):  O positive  [] GBS at 36 wks:  [] Breastfeeding  [] Postpartum Birth control method:   [x] 39 weeks discussion of IOL vs. Expectant management:  Discussed ARRIVE trial  [x] Mode of delivery: Vaginal              S/p tDap today  Planning unmedicated birth, recommend classes, hypnobirthing etc.  Doing online course currently.  RTC in 2 weeks    Mike Masters MD

## 2024-03-07 ENCOUNTER — ROUTINE PRENATAL (OUTPATIENT)
Dept: OBSTETRICS AND GYNECOLOGY | Facility: CLINIC | Age: 29
End: 2024-03-07
Payer: COMMERCIAL

## 2024-03-07 VITALS — SYSTOLIC BLOOD PRESSURE: 108 MMHG | DIASTOLIC BLOOD PRESSURE: 67 MMHG | BODY MASS INDEX: 24.98 KG/M2 | WEIGHT: 141 LBS

## 2024-03-07 DIAGNOSIS — Z34.92 ENCOUNTER FOR PREGNANCY RELATED EXAMINATION IN SECOND TRIMESTER (HHS-HCC): Primary | ICD-10-CM

## 2024-03-07 PROCEDURE — 90715 TDAP VACCINE 7 YRS/> IM: CPT | Performed by: OBSTETRICS & GYNECOLOGY

## 2024-03-07 PROCEDURE — 90471 IMMUNIZATION ADMIN: CPT | Performed by: OBSTETRICS & GYNECOLOGY

## 2024-03-07 PROCEDURE — 0501F PRENATAL FLOW SHEET: CPT | Performed by: OBSTETRICS & GYNECOLOGY

## 2024-03-19 NOTE — PROGRESS NOTES
Ob Follow-up    SUBJECTIVE    HPI: Cara Monsivais is a 28 y.o.  at 33w here for RPNV.  She denies vaginal bleeding, leakage of fluid, decreased fetal movements, and contractions.       OBJECTIVE  Visit Vitals  /66   Wt 64 kg (141 lb)   LMP 2023 (Exact Date)   BMI 24.98 kg/m²   OB Status Pregnant   Smoking Status Never   BSA 1.69 m²      See OB flowsheet    ASSESSMENT & PLAN    Cara Monsivais is a 28 y.o.  at 33w here for the following concerns we addressed today:    Problem List Items Addressed This Visit          Ob-Gyn Problems    Encounter for pregnancy related examination in second trimester - Primary    Overview     Dating:   [x] Initial BMI:  21  [x] Prenatal Labs:  Reviewed  [x] Genetic Screening:  rrcfDNA screen  [x] Baby ASA:  Yes  [x] Anatomy US: Normal  [x] 1hr GCT at 24-28wks: Normal  [x] Tdap (27-36wks):  Done  [x] Flu Shot:  Done  [x] Rhogam (if Rh neg):  O positive  [] GBS at 36 wks:  [x] Breastfeeding:  Yes  [] Postpartum Birth control method:   [x] 39 weeks discussion of IOL vs. Expectant management:  Discussed ARRIVE trial, hoping for spontaneous labor  [x] Mode of delivery: Vaginal            Planning unmedicated birth   -Doing online hypnobirthing course  GBS next visit  Hoping for spontaneous labor  RTC in 2 weeks    Mike Masters MD

## 2024-03-20 ENCOUNTER — ROUTINE PRENATAL (OUTPATIENT)
Dept: OBSTETRICS AND GYNECOLOGY | Facility: CLINIC | Age: 29
End: 2024-03-20
Payer: COMMERCIAL

## 2024-03-20 VITALS — BODY MASS INDEX: 24.98 KG/M2 | WEIGHT: 141 LBS | DIASTOLIC BLOOD PRESSURE: 66 MMHG | SYSTOLIC BLOOD PRESSURE: 103 MMHG

## 2024-03-20 DIAGNOSIS — Z34.92 ENCOUNTER FOR PREGNANCY RELATED EXAMINATION IN SECOND TRIMESTER (HHS-HCC): Primary | ICD-10-CM

## 2024-03-20 PROCEDURE — 0501F PRENATAL FLOW SHEET: CPT | Performed by: OBSTETRICS & GYNECOLOGY

## 2024-04-09 NOTE — PROGRESS NOTES
Ob Follow-up    SUBJECTIVE    HPI: Cara Monsivais is a 29 y.o.  at 36w1d here for RPNV.  She denies vaginal bleeding, leakage of fluid, decreased fetal movements, and contractions.       OBJECTIVE  Visit Vitals  /72   Wt 66.2 kg (146 lb)   LMP 2023 (Exact Date)   BMI 25.86 kg/m²   OB Status Pregnant   Smoking Status Never   BSA 1.72 m²      See OB flowsheet    ASSESSMENT & PLAN    Cara Monsivais is a 29 y.o.  at 36w1d here for the following concerns we addressed today:    Problem List Items Addressed This Visit          Ob-Gyn Problems    Encounter for pregnancy related examination in second trimester - Primary    Overview     Dating:   [x] Initial BMI:  21  [x] Prenatal Labs:  Reviewed  [x] Genetic Screening:  rrcfDNA screen  [x] Baby ASA:  Yes  [x] Anatomy US: Normal  [x] 1hr GCT at 24-28wks: Normal  [x] Tdap (27-36wks):  Done  [x] Flu Shot:  Done  [x] Rhogam (if Rh neg):  O positive  [] GBS at 36 wks:  [x] Breastfeeding:  Yes  [] Postpartum Birth control method:   [x] 39 weeks discussion of IOL vs. Expectant management:  Discussed ARRIVE trial, hoping for spontaneous labor  [x] Mode of delivery: Vaginal            Planning unmedicated birth   -Doing online hypnobirthing course  GBS today  Hoping for spontaneous labor  Labor and preeclampsia precautions discussed  RTC in 1 week    Mike Masters MD

## 2024-04-11 ENCOUNTER — ROUTINE PRENATAL (OUTPATIENT)
Dept: OBSTETRICS AND GYNECOLOGY | Facility: CLINIC | Age: 29
End: 2024-04-11
Payer: COMMERCIAL

## 2024-04-11 VITALS — WEIGHT: 146 LBS | DIASTOLIC BLOOD PRESSURE: 72 MMHG | SYSTOLIC BLOOD PRESSURE: 105 MMHG | BODY MASS INDEX: 25.86 KG/M2

## 2024-04-11 DIAGNOSIS — Z34.92 ENCOUNTER FOR PREGNANCY RELATED EXAMINATION IN SECOND TRIMESTER (HHS-HCC): Primary | ICD-10-CM

## 2024-04-11 PROCEDURE — 87081 CULTURE SCREEN ONLY: CPT

## 2024-04-11 PROCEDURE — 0501F PRENATAL FLOW SHEET: CPT | Performed by: OBSTETRICS & GYNECOLOGY

## 2024-04-14 LAB — GP B STREP GENITAL QL CULT: NORMAL

## 2024-04-17 NOTE — PROGRESS NOTES
Ob Follow-up    SUBJECTIVE    HPI: Cara Monsivais is a 29 y.o.  at 37w1d here for RPNV.  She denies vaginal bleeding, leakage of fluid, decreased fetal movements, and contractions.       OBJECTIVE  Visit Vitals  BP 93/59   Wt 67.6 kg (149 lb)   LMP 2023 (Exact Date)   BMI 26.39 kg/m²   OB Status Pregnant   Smoking Status Never   BSA 1.73 m²      See OB flowsheet    ASSESSMENT & PLAN    Cara Monsivais is a 29 y.o.  at 37w1d here for the following concerns we addressed today:    Problem List Items Addressed This Visit          Ob-Gyn Problems    Encounter for pregnancy related examination in second trimester ([facility]) - Primary    Overview     Dating:   [x] Initial BMI:  21  [x] Prenatal Labs:  Reviewed  [x] Genetic Screening:  rrcfDNA screen  [x] Baby ASA:  Yes  [x] Anatomy US: Normal  [x] 1hr GCT at 24-28wks: Normal  [x] Tdap (27-36wks):  Done  [x] Flu Shot:  Done  [x] Rhogam (if Rh neg):  O positive  [x] GBS at 36 wks:  Negative  [x] Breastfeeding:  Yes  [] Postpartum Birth control method:   [x] 39 weeks discussion of IOL vs. Expectant management:  Discussed ARRIVE trial, hoping for spontaneous labor  [x] Labor:  Planning unmedicated birth, took hypnobirthing classes  [x] Mode of delivery: Vaginal            Planning unmedicated birth  -Hoping to use tub, reviewed guidelines  GBS negative  Hoping for spontaneous labor  Labor and preeclampsia precautions discussed  RTC in 1 week    Mike Masters MD

## 2024-04-18 ENCOUNTER — ROUTINE PRENATAL (OUTPATIENT)
Dept: OBSTETRICS AND GYNECOLOGY | Facility: CLINIC | Age: 29
End: 2024-04-18
Payer: COMMERCIAL

## 2024-04-18 VITALS — WEIGHT: 149 LBS | DIASTOLIC BLOOD PRESSURE: 59 MMHG | SYSTOLIC BLOOD PRESSURE: 93 MMHG | BODY MASS INDEX: 26.39 KG/M2

## 2024-04-18 DIAGNOSIS — Z34.92 ENCOUNTER FOR PREGNANCY RELATED EXAMINATION IN SECOND TRIMESTER (HHS-HCC): Primary | ICD-10-CM

## 2024-04-18 PROCEDURE — 0501F PRENATAL FLOW SHEET: CPT | Performed by: OBSTETRICS & GYNECOLOGY

## 2024-04-23 NOTE — PROGRESS NOTES
Ob Follow-up    SUBJECTIVE    HPI: Cara Monsivais is a 29 y.o.  at 38w1d here for RPNV.  She denies vaginal bleeding, leakage of fluid, decreased fetal movements, and contractions.       OBJECTIVE  Visit Vitals  BP 96/67   Wt 66.7 kg (147 lb)   LMP 2023 (Exact Date)   BMI 26.04 kg/m²   OB Status Pregnant   Smoking Status Never   BSA 1.72 m²      See OB flowsheet    ASSESSMENT & PLAN    Cara Monsivais is a 29 y.o.  at 38w1d here for the following concerns we addressed today:    Problem List Items Addressed This Visit          Ob-Gyn Problems    Encounter for pregnancy related examination in second trimester ([facility]) - Primary    Overview     Dating:   [x] Initial BMI:  21  [x] Prenatal Labs:  Reviewed  [x] Genetic Screening:  rrcfDNA screen  [x] Baby ASA:  Yes  [x] Anatomy US: Normal  [x] 1hr GCT at 24-28wks: Normal  [x] Tdap (27-36wks):  Done  [x] Flu Shot:  Done  [x] Rhogam (if Rh neg):  O positive  [x] GBS at 36 wks:  Negative  [x] Breastfeeding:  Yes  [] Postpartum Birth control method:   [x] 39 weeks discussion of IOL vs. Expectant management:  Discussed ARRIVE trial, hoping for spontaneous labor  [x] Labor:  Planning unmedicated birth, took hypnobirthing classes  [x] Mode of delivery: Vaginal              Planning unmedicated birth  -Aware of water immersion guidelines  GBS negative  Hoping for spontaneous labor  Labor and preeclampsia precautions discussed  RTC in 1 week    Mike Masters MD

## 2024-04-25 ENCOUNTER — ROUTINE PRENATAL (OUTPATIENT)
Dept: OBSTETRICS AND GYNECOLOGY | Facility: CLINIC | Age: 29
End: 2024-04-25
Payer: COMMERCIAL

## 2024-04-25 VITALS — SYSTOLIC BLOOD PRESSURE: 96 MMHG | WEIGHT: 147 LBS | BODY MASS INDEX: 26.04 KG/M2 | DIASTOLIC BLOOD PRESSURE: 67 MMHG

## 2024-04-25 DIAGNOSIS — Z34.93 ENCOUNTER FOR PREGNANCY RELATED EXAMINATION IN THIRD TRIMESTER (HHS-HCC): Primary | ICD-10-CM

## 2024-04-25 PROCEDURE — 0501F PRENATAL FLOW SHEET: CPT | Performed by: OBSTETRICS & GYNECOLOGY

## 2024-05-01 NOTE — PROGRESS NOTES
Ob Follow-up    SUBJECTIVE    HPI: Cara Monsivais is a 29 y.o.  at 39w1d here for RPNV.  She denies vaginal bleeding, leakage of fluid, decreased fetal movements, and contractions.       OBJECTIVE  Visit Vitals  /70   Wt 65.8 kg (145 lb)   LMP 2023 (Exact Date)   BMI 25.69 kg/m²   OB Status Pregnant   Smoking Status Never   BSA 1.71 m²      See OB flowsheet    ASSESSMENT & PLAN    Cara Monsivais is a 29 y.o.  at 39w1d here for the following concerns we addressed today:    Problem List Items Addressed This Visit          Ob-Gyn Problems    Encounter for pregnancy related examination in second trimester ([facility]) - Primary    Overview     Dating:   [x] Initial BMI:  21  [x] Prenatal Labs:  Reviewed  [x] Genetic Screening:  rrcfDNA screen  [x] Baby ASA:  Yes  [x] Anatomy US: Normal  [x] 1hr GCT at 24-28wks: Normal  [x] Tdap (27-36wks):  Done  [x] Flu Shot:  Done  [x] Rhogam (if Rh neg):  O positive  [x] GBS at 36 wks:  Negative  [x] Breastfeeding:  Yes  [] Postpartum Birth control method:   [x] 39 weeks discussion of IOL vs. Expectant management:  Discussed ARRIVE trial, hoping for spontaneous labor  [x] Labor:  Planning unmedicated birth, took hypnobirthing classes, aware of water immersion guidelines  [x] Mode of delivery: Vaginal            Aunt just passed away, her mom is getting here 5/7  Hoping for spontaneous labor, interested in membrane stripping next week  Labor and preeclampsia precautions discussed  RTC in 1 week    Mike Masters MD

## 2024-05-02 ENCOUNTER — ROUTINE PRENATAL (OUTPATIENT)
Dept: OBSTETRICS AND GYNECOLOGY | Facility: CLINIC | Age: 29
End: 2024-05-02
Payer: COMMERCIAL

## 2024-05-02 VITALS — DIASTOLIC BLOOD PRESSURE: 70 MMHG | BODY MASS INDEX: 25.69 KG/M2 | SYSTOLIC BLOOD PRESSURE: 100 MMHG | WEIGHT: 145 LBS

## 2024-05-02 DIAGNOSIS — Z34.92 ENCOUNTER FOR PREGNANCY RELATED EXAMINATION IN SECOND TRIMESTER (HHS-HCC): Primary | ICD-10-CM

## 2024-05-02 PROCEDURE — 0501F PRENATAL FLOW SHEET: CPT | Performed by: OBSTETRICS & GYNECOLOGY

## 2024-05-07 NOTE — PROGRESS NOTES
Ob Follow-up    SUBJECTIVE    HPI: Cara Monsivais is a 29 y.o.  at 40w1d here for RPNV.  She denies vaginal bleeding, leakage of fluid, decreased fetal movements, and contractions.       OBJECTIVE  Visit Vitals  /71   Wt 67.1 kg (148 lb)   LMP 2023 (Exact Date)   BMI 26.22 kg/m²   OB Status Pregnant   Smoking Status Never   BSA 1.73 m²      See OB flowsheet    ASSESSMENT & PLAN    Cara Monsivais is a 29 y.o.  at 40w1d here for the following concerns we addressed today:    Problem List Items Addressed This Visit          Ob-Gyn Problems    Encounter for pregnancy related examination in second trimester ([facility]) - Primary    Overview     Dating:   [x] Initial BMI:  21  [x] Prenatal Labs:  Reviewed  [x] Genetic Screening:  rrcfDNA screen  [x] Baby ASA:  Yes  [x] Anatomy US: Normal  [x] 1hr GCT at 24-28wks: Normal  [x] Tdap (27-36wks):  Done  [x] Flu Shot:  Done  [x] Rhogam (if Rh neg):  O positive  [x] GBS at 36 wks:  Negative  [x] Breastfeeding:  Yes  [] Postpartum Birth control method:   [x] 39 weeks discussion of IOL vs. Expectant management:  Discussed ARRIVE trial, hoping for spontaneous labor  [x] Labor:  Planning unmedicated birth, took hypnobirthing classes, aware of water immersion guidelines  [x] Mode of delivery: Vaginal              Extensive discussion regarding normal course of induction in detail including cytotec, CRB, pitocin, expected duration 24-48 hours.  Hospital coverage again reviewed.  Many, many questions answered.    Discussed my recommendation for induction at 41w and increased risk of still birth.  [facility] would like to schedule induction next week on Wednesday.  Labor and preeclampsia precautions discussed.    Mike Masters MD

## 2024-05-09 ENCOUNTER — ROUTINE PRENATAL (OUTPATIENT)
Dept: OBSTETRICS AND GYNECOLOGY | Facility: CLINIC | Age: 29
End: 2024-05-09
Payer: COMMERCIAL

## 2024-05-09 VITALS — DIASTOLIC BLOOD PRESSURE: 71 MMHG | SYSTOLIC BLOOD PRESSURE: 106 MMHG | BODY MASS INDEX: 26.22 KG/M2 | WEIGHT: 148 LBS

## 2024-05-09 DIAGNOSIS — Z34.92 ENCOUNTER FOR PREGNANCY RELATED EXAMINATION IN SECOND TRIMESTER (HHS-HCC): Primary | ICD-10-CM

## 2024-05-09 PROCEDURE — 0501F PRENATAL FLOW SHEET: CPT | Performed by: OBSTETRICS & GYNECOLOGY

## 2024-05-12 ENCOUNTER — HOSPITAL ENCOUNTER (INPATIENT)
Facility: HOSPITAL | Age: 29
LOS: 2 days | Discharge: HOME | End: 2024-05-14
Attending: OBSTETRICS & GYNECOLOGY | Admitting: OBSTETRICS & GYNECOLOGY
Payer: COMMERCIAL

## 2024-05-12 PROBLEM — Z3A.40 40 WEEKS GESTATION OF PREGNANCY (HHS-HCC): Status: ACTIVE | Noted: 2024-05-12

## 2024-05-12 PROBLEM — Z3A.39 39 WEEKS GESTATION OF PREGNANCY (HHS-HCC): Status: ACTIVE | Noted: 2024-05-12

## 2024-05-12 LAB
ABO GROUP (TYPE) IN BLOOD: NORMAL
ANTIBODY SCREEN: NORMAL
ERYTHROCYTE [DISTWIDTH] IN BLOOD BY AUTOMATED COUNT: 12.5 % (ref 11.5–14.5)
HCT VFR BLD AUTO: 38.3 % (ref 36–46)
HGB BLD-MCNC: 13.3 G/DL (ref 12–16)
MCH RBC QN AUTO: 29 PG (ref 26–34)
MCHC RBC AUTO-ENTMCNC: 34.7 G/DL (ref 32–36)
MCV RBC AUTO: 84 FL (ref 80–100)
NRBC BLD-RTO: 0 /100 WBCS (ref 0–0)
PLATELET # BLD AUTO: 180 X10*3/UL (ref 150–450)
RBC # BLD AUTO: 4.58 X10*6/UL (ref 4–5.2)
RH FACTOR (ANTIGEN D): NORMAL
WBC # BLD AUTO: 12.4 X10*3/UL (ref 4.4–11.3)

## 2024-05-12 PROCEDURE — 7210000002 HC LABOR PER HOUR

## 2024-05-12 PROCEDURE — 2500000004 HC RX 250 GENERAL PHARMACY W/ HCPCS (ALT 636 FOR OP/ED): Performed by: OBSTETRICS & GYNECOLOGY

## 2024-05-12 PROCEDURE — 85027 COMPLETE CBC AUTOMATED: CPT | Performed by: OBSTETRICS & GYNECOLOGY

## 2024-05-12 PROCEDURE — 2500000005 HC RX 250 GENERAL PHARMACY W/O HCPCS: Performed by: OBSTETRICS & GYNECOLOGY

## 2024-05-12 PROCEDURE — 86780 TREPONEMA PALLIDUM: CPT | Mod: AHULAB | Performed by: OBSTETRICS & GYNECOLOGY

## 2024-05-12 PROCEDURE — 0HQ9XZZ REPAIR PERINEUM SKIN, EXTERNAL APPROACH: ICD-10-PCS | Performed by: MIDWIFE

## 2024-05-12 PROCEDURE — 36415 COLL VENOUS BLD VENIPUNCTURE: CPT | Performed by: OBSTETRICS & GYNECOLOGY

## 2024-05-12 PROCEDURE — 59409 OBSTETRICAL CARE: CPT | Performed by: MIDWIFE

## 2024-05-12 PROCEDURE — 1100000001 HC PRIVATE ROOM DAILY

## 2024-05-12 PROCEDURE — 59400 OBSTETRICAL CARE: CPT | Performed by: MIDWIFE

## 2024-05-12 PROCEDURE — 59050 FETAL MONITOR W/REPORT: CPT

## 2024-05-12 PROCEDURE — 2500000001 HC RX 250 WO HCPCS SELF ADMINISTERED DRUGS (ALT 637 FOR MEDICARE OP): Performed by: OBSTETRICS & GYNECOLOGY

## 2024-05-12 PROCEDURE — 86901 BLOOD TYPING SEROLOGIC RH(D): CPT | Performed by: OBSTETRICS & GYNECOLOGY

## 2024-05-12 RX ORDER — METOCLOPRAMIDE 10 MG/1
10 TABLET ORAL EVERY 6 HOURS PRN
Status: DISCONTINUED | OUTPATIENT
Start: 2024-05-12 | End: 2024-05-12

## 2024-05-12 RX ORDER — POLYETHYLENE GLYCOL 3350 17 G/17G
17 POWDER, FOR SOLUTION ORAL 2 TIMES DAILY PRN
Status: DISCONTINUED | OUTPATIENT
Start: 2024-05-12 | End: 2024-05-14 | Stop reason: HOSPADM

## 2024-05-12 RX ORDER — LOPERAMIDE HYDROCHLORIDE 2 MG/1
4 CAPSULE ORAL EVERY 2 HOUR PRN
Status: DISCONTINUED | OUTPATIENT
Start: 2024-05-12 | End: 2024-05-12

## 2024-05-12 RX ORDER — ADHESIVE BANDAGE
10 BANDAGE TOPICAL
Status: DISCONTINUED | OUTPATIENT
Start: 2024-05-12 | End: 2024-05-14 | Stop reason: HOSPADM

## 2024-05-12 RX ORDER — LABETALOL HYDROCHLORIDE 5 MG/ML
20 INJECTION, SOLUTION INTRAVENOUS ONCE AS NEEDED
Status: DISCONTINUED | OUTPATIENT
Start: 2024-05-12 | End: 2024-05-12

## 2024-05-12 RX ORDER — LIDOCAINE HYDROCHLORIDE 10 MG/ML
30 INJECTION INFILTRATION; PERINEURAL ONCE AS NEEDED
Status: COMPLETED | OUTPATIENT
Start: 2024-05-12 | End: 2024-05-12

## 2024-05-12 RX ORDER — LOPERAMIDE HYDROCHLORIDE 2 MG/1
4 CAPSULE ORAL EVERY 2 HOUR PRN
Status: DISCONTINUED | OUTPATIENT
Start: 2024-05-12 | End: 2024-05-14 | Stop reason: HOSPADM

## 2024-05-12 RX ORDER — TRANEXAMIC ACID 100 MG/ML
1000 INJECTION, SOLUTION INTRAVENOUS ONCE AS NEEDED
Status: DISCONTINUED | OUTPATIENT
Start: 2024-05-12 | End: 2024-05-14 | Stop reason: HOSPADM

## 2024-05-12 RX ORDER — NIFEDIPINE 10 MG/1
10 CAPSULE ORAL ONCE AS NEEDED
Status: DISCONTINUED | OUTPATIENT
Start: 2024-05-12 | End: 2024-05-12

## 2024-05-12 RX ORDER — ESOMEPRAZOLE MAGNESIUM 40 MG/1
20 GRANULE, DELAYED RELEASE ORAL
Status: DISCONTINUED | OUTPATIENT
Start: 2024-05-13 | End: 2024-05-12

## 2024-05-12 RX ORDER — HYDRALAZINE HYDROCHLORIDE 20 MG/ML
5 INJECTION INTRAMUSCULAR; INTRAVENOUS ONCE AS NEEDED
Status: DISCONTINUED | OUTPATIENT
Start: 2024-05-12 | End: 2024-05-14 | Stop reason: HOSPADM

## 2024-05-12 RX ORDER — METHYLERGONOVINE MALEATE 0.2 MG/ML
0.2 INJECTION INTRAVENOUS ONCE AS NEEDED
Status: DISCONTINUED | OUTPATIENT
Start: 2024-05-12 | End: 2024-05-12

## 2024-05-12 RX ORDER — CARBOPROST TROMETHAMINE 250 UG/ML
250 INJECTION, SOLUTION INTRAMUSCULAR ONCE AS NEEDED
Status: DISCONTINUED | OUTPATIENT
Start: 2024-05-12 | End: 2024-05-14 | Stop reason: HOSPADM

## 2024-05-12 RX ORDER — HYDRALAZINE HYDROCHLORIDE 20 MG/ML
5 INJECTION INTRAMUSCULAR; INTRAVENOUS ONCE AS NEEDED
Status: DISCONTINUED | OUTPATIENT
Start: 2024-05-12 | End: 2024-05-12

## 2024-05-12 RX ORDER — ONDANSETRON HYDROCHLORIDE 2 MG/ML
4 INJECTION, SOLUTION INTRAVENOUS EVERY 6 HOURS PRN
Status: DISCONTINUED | OUTPATIENT
Start: 2024-05-12 | End: 2024-05-14 | Stop reason: HOSPADM

## 2024-05-12 RX ORDER — METHYLERGONOVINE MALEATE 0.2 MG/ML
0.2 INJECTION INTRAVENOUS ONCE AS NEEDED
Status: DISCONTINUED | OUTPATIENT
Start: 2024-05-12 | End: 2024-05-14 | Stop reason: HOSPADM

## 2024-05-12 RX ORDER — ONDANSETRON HYDROCHLORIDE 2 MG/ML
4 INJECTION, SOLUTION INTRAVENOUS EVERY 6 HOURS PRN
Status: DISCONTINUED | OUTPATIENT
Start: 2024-05-12 | End: 2024-05-12

## 2024-05-12 RX ORDER — IBUPROFEN 600 MG/1
600 TABLET ORAL EVERY 6 HOURS
Status: DISCONTINUED | OUTPATIENT
Start: 2024-05-12 | End: 2024-05-14 | Stop reason: HOSPADM

## 2024-05-12 RX ORDER — NIFEDIPINE 10 MG/1
10 CAPSULE ORAL ONCE AS NEEDED
Status: DISCONTINUED | OUTPATIENT
Start: 2024-05-12 | End: 2024-05-14 | Stop reason: HOSPADM

## 2024-05-12 RX ORDER — TRANEXAMIC ACID 100 MG/ML
1000 INJECTION, SOLUTION INTRAVENOUS ONCE AS NEEDED
Status: DISCONTINUED | OUTPATIENT
Start: 2024-05-12 | End: 2024-05-12

## 2024-05-12 RX ORDER — LIDOCAINE 560 MG/1
1 PATCH PERCUTANEOUS; TOPICAL; TRANSDERMAL
Status: DISCONTINUED | OUTPATIENT
Start: 2024-05-12 | End: 2024-05-14 | Stop reason: HOSPADM

## 2024-05-12 RX ORDER — SODIUM CHLORIDE, SODIUM LACTATE, POTASSIUM CHLORIDE, CALCIUM CHLORIDE 600; 310; 30; 20 MG/100ML; MG/100ML; MG/100ML; MG/100ML
125 INJECTION, SOLUTION INTRAVENOUS CONTINUOUS
Status: DISCONTINUED | OUTPATIENT
Start: 2024-05-12 | End: 2024-05-12

## 2024-05-12 RX ORDER — FENTANYL/ROPIVACAINE/NS/PF 2MCG/ML-.2
0-25 PLASTIC BAG, INJECTION (ML) INJECTION CONTINUOUS
Status: DISCONTINUED | OUTPATIENT
Start: 2024-05-12 | End: 2024-05-12

## 2024-05-12 RX ORDER — ACETAMINOPHEN 325 MG/1
975 TABLET ORAL EVERY 6 HOURS
Status: DISCONTINUED | OUTPATIENT
Start: 2024-05-12 | End: 2024-05-14 | Stop reason: HOSPADM

## 2024-05-12 RX ORDER — OXYTOCIN/0.9 % SODIUM CHLORIDE 30/500 ML
60 PLASTIC BAG, INJECTION (ML) INTRAVENOUS ONCE AS NEEDED
Status: DISCONTINUED | OUTPATIENT
Start: 2024-05-12 | End: 2024-05-14 | Stop reason: HOSPADM

## 2024-05-12 RX ORDER — ONDANSETRON 4 MG/1
4 TABLET, FILM COATED ORAL EVERY 6 HOURS PRN
Status: DISCONTINUED | OUTPATIENT
Start: 2024-05-12 | End: 2024-05-12

## 2024-05-12 RX ORDER — ONDANSETRON 4 MG/1
4 TABLET, FILM COATED ORAL EVERY 6 HOURS PRN
Status: DISCONTINUED | OUTPATIENT
Start: 2024-05-12 | End: 2024-05-14 | Stop reason: HOSPADM

## 2024-05-12 RX ORDER — SIMETHICONE 80 MG
80 TABLET,CHEWABLE ORAL 4 TIMES DAILY PRN
Status: DISCONTINUED | OUTPATIENT
Start: 2024-05-12 | End: 2024-05-14 | Stop reason: HOSPADM

## 2024-05-12 RX ORDER — OXYTOCIN 10 [USP'U]/ML
10 INJECTION, SOLUTION INTRAMUSCULAR; INTRAVENOUS ONCE AS NEEDED
Status: DISCONTINUED | OUTPATIENT
Start: 2024-05-12 | End: 2024-05-14 | Stop reason: HOSPADM

## 2024-05-12 RX ORDER — MISOPROSTOL 200 UG/1
800 TABLET ORAL ONCE AS NEEDED
Status: DISCONTINUED | OUTPATIENT
Start: 2024-05-12 | End: 2024-05-14 | Stop reason: HOSPADM

## 2024-05-12 RX ORDER — PANTOPRAZOLE SODIUM 40 MG/1
40 TABLET, DELAYED RELEASE ORAL
Status: DISCONTINUED | OUTPATIENT
Start: 2024-05-13 | End: 2024-05-14 | Stop reason: HOSPADM

## 2024-05-12 RX ORDER — OXYTOCIN/0.9 % SODIUM CHLORIDE 30/500 ML
60 PLASTIC BAG, INJECTION (ML) INTRAVENOUS ONCE AS NEEDED
Status: DISCONTINUED | OUTPATIENT
Start: 2024-05-12 | End: 2024-05-12

## 2024-05-12 RX ORDER — BISACODYL 10 MG/1
10 SUPPOSITORY RECTAL DAILY PRN
Status: DISCONTINUED | OUTPATIENT
Start: 2024-05-12 | End: 2024-05-14 | Stop reason: HOSPADM

## 2024-05-12 RX ORDER — CARBOPROST TROMETHAMINE 250 UG/ML
250 INJECTION, SOLUTION INTRAMUSCULAR ONCE AS NEEDED
Status: DISCONTINUED | OUTPATIENT
Start: 2024-05-12 | End: 2024-05-12

## 2024-05-12 RX ORDER — LABETALOL HYDROCHLORIDE 5 MG/ML
20 INJECTION, SOLUTION INTRAVENOUS ONCE AS NEEDED
Status: DISCONTINUED | OUTPATIENT
Start: 2024-05-12 | End: 2024-05-14 | Stop reason: HOSPADM

## 2024-05-12 RX ORDER — MISOPROSTOL 200 UG/1
800 TABLET ORAL ONCE AS NEEDED
Status: DISCONTINUED | OUTPATIENT
Start: 2024-05-12 | End: 2024-05-12

## 2024-05-12 RX ORDER — DIPHENHYDRAMINE HYDROCHLORIDE 50 MG/ML
25 INJECTION INTRAMUSCULAR; INTRAVENOUS EVERY 6 HOURS PRN
Status: DISCONTINUED | OUTPATIENT
Start: 2024-05-12 | End: 2024-05-14 | Stop reason: HOSPADM

## 2024-05-12 RX ORDER — DIPHENHYDRAMINE HCL 25 MG
25 CAPSULE ORAL EVERY 6 HOURS PRN
Status: DISCONTINUED | OUTPATIENT
Start: 2024-05-12 | End: 2024-05-14 | Stop reason: HOSPADM

## 2024-05-12 RX ORDER — TERBUTALINE SULFATE 1 MG/ML
0.25 INJECTION SUBCUTANEOUS ONCE AS NEEDED
Status: DISCONTINUED | OUTPATIENT
Start: 2024-05-12 | End: 2024-05-12

## 2024-05-12 RX ORDER — OXYTOCIN 10 [USP'U]/ML
10 INJECTION, SOLUTION INTRAMUSCULAR; INTRAVENOUS ONCE AS NEEDED
Status: DISCONTINUED | OUTPATIENT
Start: 2024-05-12 | End: 2024-05-12

## 2024-05-12 RX ORDER — METOCLOPRAMIDE HYDROCHLORIDE 5 MG/ML
10 INJECTION INTRAMUSCULAR; INTRAVENOUS EVERY 6 HOURS PRN
Status: DISCONTINUED | OUTPATIENT
Start: 2024-05-12 | End: 2024-05-12

## 2024-05-12 RX ADMIN — BENZOCAINE AND LEVOMENTHOL 1 APPLICATION: 200; 5 SPRAY TOPICAL at 21:37

## 2024-05-12 RX ADMIN — ONDANSETRON 4 MG: 2 INJECTION INTRAMUSCULAR; INTRAVENOUS at 14:10

## 2024-05-12 RX ADMIN — SODIUM CHLORIDE, POTASSIUM CHLORIDE, SODIUM LACTATE AND CALCIUM CHLORIDE 125 ML/HR: 600; 310; 30; 20 INJECTION, SOLUTION INTRAVENOUS at 14:17

## 2024-05-12 RX ADMIN — LIDOCAINE HYDROCHLORIDE 300 MG: 10 INJECTION, SOLUTION INFILTRATION; PERINEURAL at 20:40

## 2024-05-12 SDOH — HEALTH STABILITY: MENTAL HEALTH: HAVE YOU USED ANY PRESCRIPTION DRUGS OTHER THAN PRESCRIBED IN THE PAST 12 MONTHS?: NO

## 2024-05-12 SDOH — SOCIAL STABILITY: SOCIAL INSECURITY: ABUSE SCREEN: ADULT

## 2024-05-12 SDOH — HEALTH STABILITY: MENTAL HEALTH: NON-SPECIFIC ACTIVE SUICIDAL THOUGHTS (PAST 1 MONTH): NO

## 2024-05-12 SDOH — SOCIAL STABILITY: SOCIAL INSECURITY: DOES ANYONE TRY TO KEEP YOU FROM HAVING/CONTACTING OTHER FRIENDS OR DOING THINGS OUTSIDE YOUR HOME?: NO

## 2024-05-12 SDOH — SOCIAL STABILITY: SOCIAL INSECURITY: HAVE YOU HAD ANY THOUGHTS OF HARMING ANYONE ELSE?: NO

## 2024-05-12 SDOH — SOCIAL STABILITY: SOCIAL INSECURITY: VERBAL ABUSE: DENIES

## 2024-05-12 SDOH — SOCIAL STABILITY: SOCIAL INSECURITY: PHYSICAL ABUSE: DENIES

## 2024-05-12 SDOH — HEALTH STABILITY: MENTAL HEALTH: HAVE YOU USED ANY SUBSTANCES (CANABIS, COCAINE, HEROIN, HALLUCINOGENS, INHALANTS, ETC.) IN THE PAST 12 MONTHS?: NO

## 2024-05-12 SDOH — HEALTH STABILITY: MENTAL HEALTH: SUICIDAL BEHAVIOR (LIFETIME): NO

## 2024-05-12 SDOH — HEALTH STABILITY: MENTAL HEALTH: WISH TO BE DEAD (PAST 1 MONTH): YES

## 2024-05-12 SDOH — HEALTH STABILITY: MENTAL HEALTH: WERE YOU ABLE TO COMPLETE ALL THE BEHAVIORAL HEALTH SCREENINGS?: YES

## 2024-05-12 SDOH — SOCIAL STABILITY: SOCIAL INSECURITY: ARE THERE ANY APPARENT SIGNS OF INJURIES/BEHAVIORS THAT COULD BE RELATED TO ABUSE/NEGLECT?: NO

## 2024-05-12 SDOH — SOCIAL STABILITY: SOCIAL INSECURITY: ARE YOU OR HAVE YOU BEEN THREATENED OR ABUSED PHYSICALLY, EMOTIONALLY, OR SEXUALLY BY ANYONE?: NO

## 2024-05-12 SDOH — ECONOMIC STABILITY: HOUSING INSECURITY: DO YOU FEEL UNSAFE GOING BACK TO THE PLACE WHERE YOU ARE LIVING?: NO

## 2024-05-12 SDOH — SOCIAL STABILITY: SOCIAL INSECURITY: HAVE YOU HAD THOUGHTS OF HARMING ANYONE ELSE?: YES

## 2024-05-12 SDOH — HEALTH STABILITY: MENTAL HEALTH: ACTIVE SUICIDAL IDEATION WITH SOME INTENT TO ACT, WITHOUT SPECIFIC PLAN (PAST 1 MONTH): NO

## 2024-05-12 SDOH — SOCIAL STABILITY: SOCIAL INSECURITY: DO YOU FEEL ANYONE HAS EXPLOITED OR TAKEN ADVANTAGE OF YOU FINANCIALLY OR OF YOUR PERSONAL PROPERTY?: NO

## 2024-05-12 SDOH — HEALTH STABILITY: MENTAL HEALTH: ACTIVE SUICIDAL IDEATION WITH SPECIFIC PLAN AND INTENT (PAST 1 MONTH): NO

## 2024-05-12 SDOH — SOCIAL STABILITY: SOCIAL INSECURITY: HAS ANYONE EVER THREATENED TO HURT YOUR FAMILY OR YOUR PETS?: NO

## 2024-05-12 ASSESSMENT — PAIN SCALES - GENERAL
PAINLEVEL_OUTOF10: 10 - WORST POSSIBLE PAIN
PAINLEVEL_OUTOF10: 1
PAINLEVEL_OUTOF10: 10 - WORST POSSIBLE PAIN
PAINLEVEL_OUTOF10: 10 - WORST POSSIBLE PAIN
PAINLEVEL_OUTOF10: 5 - MODERATE PAIN
PAINLEVEL_OUTOF10: 5 - MODERATE PAIN
PAINLEVEL_OUTOF10: 4
PAINLEVEL_OUTOF10: 10 - WORST POSSIBLE PAIN

## 2024-05-12 ASSESSMENT — PATIENT HEALTH QUESTIONNAIRE - PHQ9
2. FEELING DOWN, DEPRESSED OR HOPELESS: NOT AT ALL
1. LITTLE INTEREST OR PLEASURE IN DOING THINGS: NOT AT ALL
SUM OF ALL RESPONSES TO PHQ9 QUESTIONS 1 & 2: 0

## 2024-05-12 ASSESSMENT — LIFESTYLE VARIABLES
SKIP TO QUESTIONS 9-10: 1
HOW OFTEN DO YOU HAVE A DRINK CONTAINING ALCOHOL: NEVER
AUDIT-C TOTAL SCORE: 0
AUDIT-C TOTAL SCORE: 0
HOW MANY STANDARD DRINKS CONTAINING ALCOHOL DO YOU HAVE ON A TYPICAL DAY: PATIENT DOES NOT DRINK
HOW OFTEN DO YOU HAVE 6 OR MORE DRINKS ON ONE OCCASION: NEVER

## 2024-05-12 NOTE — PROGRESS NOTES
Intrapartum Progress Note    Assessment/Plan   Cara Monsivais is a 29 y.o.  at 40w4d. LANIE: 2024, by Last Menstrual Period.     Patient admitted for expectant management of latent labor.  She has progressed to /0 without Pitocin augmentation.  She is still intact.  Regarding her fetal heart rate tracing overall category 1 however she has had 2 late type decelerations that have resolved spontaneously.  Plan will be for continuous monitoring.    Principal Problem:    40 weeks gestation of pregnancy ([facility])    Pregnancy Problems (from 10/02/23 to present)       Problem Noted Resolved    Encounter for pregnancy related examination in second trimester ([facility]) 2023 by Mike Masters MD No    Priority:  Medium      Overview Addendum 2024  6:51 PM by Mike Masters MD     Dating:   [x] Initial BMI:  21  [x] Prenatal Labs:  Reviewed  [x] Genetic Screening:  rrcfDNA screen  [x] Baby ASA:  Yes  [x] Anatomy US: Normal  [x] 1hr GCT at 24-28wks: Normal  [x] Tdap (27-36wks):  Done  [x] Flu Shot:  Done  [x] Rhogam (if Rh neg):  O positive  [x] GBS at 36 wks:  Negative  [x] Breastfeeding:  Yes  [] Postpartum Birth control method:   [x] 39 weeks discussion of IOL vs. Expectant management:  Discussed ARRIVE trial, hoping for spontaneous labor  [x] Labor:  Planning unmedicated birth, took hypnobirthing classes, aware of water immersion guidelines  [x] Mode of delivery: Vaginal           40 weeks gestation of pregnancy ([facility]) 2024 by Jalen Marshall MD No            Subjective   Comfortable and active    Objective   Last Vitals:  Temp Pulse Resp BP MAP Pulse Ox   36.9 °C (98.4 °F) 80 18 112/58   97 %     Vitals Min/Max Last 24 Hours:  Temp  Min: 36.4 °C (97.5 °F)  Max: 37 °C (98.6 °F)  Pulse  Min: 80  Max: 111  Resp  Min: 18  Max: 20  BP  Min: 96/52  Max: 112/58    Intake/Output:  No intake or output data in the 24 hours ending 24 1419    Physical Examination:  GENERAL: Examination reveals a  "well developed, well nourished, gravid female in no acute distress. She is alert and cooperative.  FHR is 135 with mod anil  , with Accelerations, Variable decelerations, and a Category I tracing.    [place] reading:    CERVIX: 5 cm dilated, 80 % effaced, 0 station; MEMBRANES are Intact    Lab Review:  Labs in chart were reviewed.  Lab Results   Component Value Date    ABO O 05/12/2024    LABRH POS 05/12/2024    ABSCRN NEG 05/12/2024     Lab Results   Component Value Date    WBC 12.4 (H) 05/12/2024    HGB 13.3 05/12/2024    HCT 38.3 05/12/2024     05/12/2024     No results found for: \"GRPBSTREP\"  No results found for: \"GLUCOSE\", \"NA\", \"K\", \"CL\", \"CO2\", \"ANIONGAP\", \"BUN\", \"CREATININE\", \"EGFR\", \"CALCIUM\", \"ALBUMIN\", \"PROT\", \"ALKPHOS\", \"ALT\", \"AST\", \"BILITOT\"  No results found for: \"UTPCR\"      "

## 2024-05-12 NOTE — PROGRESS NOTES
MD called to bedside for cervical check.  Patient purvi uncomfortably.  Exam 7/100/+1 intact with bulging bag. Fetus cat1  with acccels and moderate varriability. Will continue expectant management.

## 2024-05-12 NOTE — PROGRESS NOTES
Intrapartum Progress Note    Assessment/Plan   Cara Monsivais is a 29 y.o.  at 40w4d. LANIE: 2024, by Last Menstrual Period.     Patient admitted in spontaneous labor at 40 weeks gestation after making change from 3 to 4 cm to 4 cm.  Pregnancy is essentially uncomplicated so plan will be for minimal intervention per patient request.  Counseled patient that any proposed interventions will be discussed with her and based on medical indication for management of maternal or fetal conditions or labor abnormalities.  At this time plan is for intermittent auscultation with expectant management of labor.  Will consider repeat cervical exam when it has been 4 hours since her last exam.    Principal Problem:    40 weeks gestation of pregnancy ([facility])    Pregnancy Problems (from 10/02/23 to present)       Problem Noted Resolved    39 weeks gestation of pregnancy ([facility]) 2024 by Arash Santiago MD No    Priority:  Medium      Encounter for pregnancy related examination in second trimester ([facility]) 2023 by Mike Masters MD No    Priority:  Medium      Overview Addendum 2024  6:51 PM by Mike Masters MD     Dating:   [x] Initial BMI:  21  [x] Prenatal Labs:  Reviewed  [x] Genetic Screening:  rrcfDNA screen  [x] Baby ASA:  Yes  [x] Anatomy US: Normal  [x] 1hr GCT at 24-28wks: Normal  [x] Tdap (27-36wks):  Done  [x] Flu Shot:  Done  [x] Rhogam (if Rh neg):  O positive  [x] GBS at 36 wks:  Negative  [x] Breastfeeding:  Yes  [] Postpartum Birth control method:   [x] 39 weeks discussion of IOL vs. Expectant management:  Discussed ARRIVE trial, hoping for spontaneous labor  [x] Labor:  Planning unmedicated birth, took hypnobirthing classes, aware of water immersion guidelines  [x] Mode of delivery: Vaginal                   Subjective   Patient desires minimal interventions.  [place] intermittently.    Objective   Last Vitals:  Temp Pulse Resp BP MAP Pulse Ox   37 °C (98.6 °F) 83 18 108/66   98 %  "    Vitals Min/Max Last 24 Hours:  Temp  Min: 36.4 °C (97.5 °F)  Max: 37 °C (98.6 °F)  Pulse  Min: 83  Max: 90  Resp  Min: 18  Max: 20  BP  Min: 96/52  Max: 110/70    Intake/Output:  No intake or output data in the 24 hours ending 05/12/24 0811    Physical Examination:  GENERAL: Examination reveals a well developed, well nourished, gravid female in no acute distress. She is alert and cooperative.    Lab Review:  Labs in chart were reviewed.  Lab Results   Component Value Date    ABO O 05/12/2024    LABRH POS 05/12/2024    ABSCRN NEG 05/12/2024     Lab Results   Component Value Date    WBC 12.4 (H) 05/12/2024    HGB 13.3 05/12/2024    HCT 38.3 05/12/2024     05/12/2024     No results found for: \"GRPBSTREP\"  No results found for: \"GLUCOSE\", \"NA\", \"K\", \"CL\", \"CO2\", \"ANIONGAP\", \"BUN\", \"CREATININE\", \"EGFR\", \"CALCIUM\", \"ALBUMIN\", \"PROT\", \"ALKPHOS\", \"ALT\", \"AST\", \"BILITOT\"  No results found for: \"UTPCR\"      "

## 2024-05-12 NOTE — H&P
Obstetrical Admission History and Physical     Cara Monsivais is a 29 y.o. .     Chief Complaint: Contractions (Regular contractions since 2 am. Pink tinged discharge. Denies LOF.)    Assessment/Plan    Patient currently with low risk pregnancy.  Has been having some irregular contractions.  Noticed some blood-tinged discharge today as well.  Currently is having some uterine irritability occasional contractions on monitor cervix is the same as she reported on Thursday at 3 to 4 cm 80% effaced -1 station.  Possibly some mild change.  Strip is category 1.  Nitrazine and fern are negative.  No blood noted in vaginal vault.  Discussed with patient and .  At this point does not committed to induction.  Would like to wait 2 hours to see whether or not she is in spontaneous labor and then will make a decision.  Plan on recheck in 2 hours and reevaluate    Cervical recheck 2 hours.  Fetal monitoring still category 1 having occasional contractions mild to moderate.  Cervical exam is 4 cm at 100% effaced 0 station good show.  Discussed with patient latent labor.  Will admit    Active Problems:  There are no active Hospital Problems.      Pregnancy Problems (from 10/02/23 to present)       Problem Noted Resolved    Encounter for pregnancy related examination in second trimester ([facility]) 2023 by Mike Masters MD No    Priority:  Medium      Overview Addendum 2024  6:51 PM by Mike Masters MD     Dating:   [x] Initial BMI:  21  [x] Prenatal Labs:  Reviewed  [x] Genetic Screening:  rrcfDNA screen  [x] Baby ASA:  Yes  [x] Anatomy US: Normal  [x] 1hr GCT at 24-28wks: Normal  [x] Tdap (27-36wks):  Done  [x] Flu Shot:  Done  [x] Rhogam (if Rh neg):  O positive  [x] GBS at 36 wks:  Negative  [x] Breastfeeding:  Yes  [] Postpartum Birth control method:   [x] 39 weeks discussion of IOL vs. Expectant management:  Discussed ARRIVE trial, hoping for spontaneous labor  [x] Labor:  Planning unmedicated birth, took  hypnobirthing classes, aware of water immersion guidelines  [x] Mode of delivery: Vaginal                 Subjective   Nofar is here complaining of Uterine irritability. Good fetal movement. C/O bloody show.     Obstetrical History   OB History    Para Term  AB Living   1 0           SAB IAB Ectopic Multiple Live Births                  # Outcome Date GA Lbr Anthony/2nd Weight Sex Delivery Anes PTL Lv   1 Current                Past Medical History  Past Medical History:   Diagnosis Date    Psoriasis         Past Surgical History   Past Surgical History:   Procedure Laterality Date    APPENDECTOMY      OTHER SURGICAL HISTORY  12/15/2022    Appendectomy       Social History  Social History     Tobacco Use    Smoking status: Never    Smokeless tobacco: Never   Substance Use Topics    Alcohol use: Never     Substance and Sexual Activity   Drug Use Never       Allergies  Patient has no known allergies.     Medications  Medications Prior to Admission   Medication Sig Dispense Refill Last Dose    omeprazole (PriLOSEC) 20 mg DR capsule Take 1 capsule (20 mg) by mouth once daily. Do not crush or chew. 90 capsule 1     PRENATAL 2-IRON-FOLIC ACID-OM3 ORAL Take by mouth.          Objective    Last Vitals  Temp Pulse Resp BP MAP O2 Sat   36.4 °C (97.5 °F) 88 20 110/70   98 %     Physical Examination  GENERAL: Examination reveals a well developed, well nourished, gravid female in no acute distress. She is alert and cooperative.  LUNGS: clear to auscultation bilaterally  HEART: regular rate and rhythm, S1, S2 normal, no murmur, click, rub or gallop  ABDOMEN: soft, gravid, nontender, nondistended, no abnormal masses, no epigastric pain  FHR is  , with Accelerations, and a Category I tracing.    The fetus is in a vertex presentation, determined by ultrasound and vaginal exam  CERVIX:  3 to 4 cm 80% effaced ; MEMBRANES are Intact  EXTREMITIES: no redness or tenderness in the calves or thighs, no edema  NEUROLOGICAL: DTRs  normal and symmetrical  PSYCHOLOGICAL: awake and alert; oriented to person, place, and time    Lab Review  Labs in chart were reviewed.  Imaging reviewed

## 2024-05-13 LAB — TREPONEMA PALLIDUM IGG+IGM AB [PRESENCE] IN SERUM OR PLASMA BY IMMUNOASSAY: NONREACTIVE

## 2024-05-13 PROCEDURE — 1100000001 HC PRIVATE ROOM DAILY

## 2024-05-13 ASSESSMENT — PAIN SCALES - GENERAL
PAINLEVEL_OUTOF10: 0 - NO PAIN

## 2024-05-13 NOTE — LACTATION NOTE
Lactation Consultant Note  Lactation Consultation  Reason for Consult: Initial assessment  Consultant Name: Genny    Maternal Information  Has mother  before?: No  Infant to breast within first 2 hours of birth?: Yes    Maternal Assessment  Breast Assessment: Medium, Soft, Compressible  Nipple Assessment: Intact  Areola Assessment: Normal    Infant Assessment  Infant Behavior: Feeding cues observed, Rooting response  Infant Assessment: Good cupping of tongue    Feeding Assessment  Nutrition Source: Breastmilk  Feeding Method: Nursing at the breast  Feeding Position: Football/seated, C - hold  Suck/Feeding: Sustained  Latch Assessment: Deep latch obtained, Moderate assistance is needed, Eagerly grasped on to latch    LATCH TOOL  Latch: Grasps breast, tongue down, lips flanged, rhythmic sucking  Audible Swallowing: A few with stimulation  Type of Nipple: Everted (After stimulation)  Comfort (Breast/Nipple): Soft/non-tender  Hold (Positioning): Full assist, staff holds infant at breast  LATCH Score: 7    Breast Pump       Other OB Lactation Tools       Patient Follow-up  Inpatient Lactation Follow-up Needed : Yes    Other OB Lactation Documentation       Recommendations/Summary  In to see mom for feed. Infant less than 24 hours old. Infant showing hunger cues. Infant place in football hold and help guide moms hand to to position the breast for feeds. Infant eagerly grasps onto the breast. Discussed normal  behaviors with parents and discussed outputs and cluster feeding. Encouraged parents to call for any questions or assistance needed.   -Initial lactation visit paperwork given. Outpatient flyer discussed. PI forms 174 is My  baby getting enough,168 ,167 Sore and cracked nipples,123 Tips for pumping,,163 Breast fullness and Engorgement,728  Breast Massage with Milk Expression by Hand,165 Returning to work, how to clean breast pumps.

## 2024-05-13 NOTE — L&D DELIVERY NOTE
OB Delivery Note  2024  Cara Monsivais  29 y.o.   Vaginal, Spontaneous        Gestational Age: 40w4d  /Para:   Quantitative Blood Loss: Admission to Discharge: 100 mL (2024  3:13 AM - 2024  9:02 PM)    Leonel Monsivais [[id number]]      Labor Events    Rupture date/time: 2024 1848  Rupture type: Spontaneous  Fluid color: Clear  Fluid odor: None  Labor type: Spontaneous Onset of Labor  Labor allowed to proceed with plans for an attempted vaginal birth?: Yes  Augmentation: None  Complications: None       Labor Event Times    Dilation complete date/time: 2024  Start pushing date/time: 2024       Placenta    Placenta delivery date/time: 2024  Placenta removal: Spontaneous  Placenta appearance: Intact  Placenta disposition: discarded       Cord    Vessels: 3 vessels  Complications: None  Delayed cord clamping?: Yes  Cord clamped date/time: 2024  Cord blood disposition: Lab  Gases sent?: No  Stem cell collection (by provider): No       Lacerations    Episiotomy: None  Perineal laceration: 1st  Perineal laceration repaired?: Yes  Other lacerations?: No  Repair suture: 3-0 Synthetic Suture       Anesthesia    Method: None       Operative Delivery    Forceps attempted?: No  Vacuum extractor attempted?: No       Shoulder Dystocia    Shoulder dystocia present?: No       [place] Delivery    Birth date/time: 2024 20:28:00  Delivery type: Vaginal, Spontaneous  Complications: None       Resuscitation    Method: Tactile stimulation       Apgars    Living status: Living  Apgar Component Scores:  1 min.:  5 min.:  10 min.:  15 min.:  20 min.:    Skin color:  1  1       Heart rate:  2  2       Reflex irritability:  2  2       Muscle tone:  2  2       Respiratory effort:  2  2       Total:  9  9       Apgars assigned by: KIKI MALONE       Delivery Providers    Delivering clinician: AGNES Easley-ISABEL   Provider Role    Nelsy Garcia RN Delivery Nurse     Marga Calloway, RN Nursery Nurse             PT c/o pressure and was found to be complete and +1 station, pushing initiated. She preferred to push on hand-knee position and we used this as much as possible, though she pushed more effectively squatting and we alternated positions as she was agreeable and progress was noted until eventual . NB placed on maternal chest for delayed cord clamping. Active third stage management including PP pitocin IV and continuous gentle downward traction until spontaneous delivery of what appeared to be an intact placenta occurred. Cord clamped by CNM and cut by FOB. Perineum inspected and non-approximated first degree laceration noted that was then repaired with 3-0 vicryl until edges were approximated and hemostatic. Fundus firm, 3FW below umbilicus with scant vaginal bleeding. Pt and NB left in stable condition with FOB, pt's mother, and primary RN at bedside. OB attending Dr. Vaughn santos.     Majo Lao, AGNES-OMAIRAM

## 2024-05-13 NOTE — PROGRESS NOTES
Postpartum Progress Note    Assessment/Plan   Cara Monsivais is a 29 y.o., , who delivered at 40w4d gestation and is now postpartum day 1.    Patient will be 24 hours at 2030. Reviewed that she should plan on discharge in AM.     Principal Problem:    40 weeks gestation of pregnancy ([facility])  Active Problems:     (spontaneous vaginal delivery) ([facility])    Perineal laceration during delivery ([facility])    Term birth of  ([facility])    Pregnancy Problems (from 10/02/23 to present)       Problem Noted Resolved    40 weeks gestation of pregnancy ([facility]) 2024 by Jalen Marshall MD No    Priority:  Medium      Perineal laceration during delivery ([facility]) 2024 by RAVI Easley No    Priority:  Medium      Encounter for pregnancy related examination in second trimester ([facility]) 2023 by Mike Masters MD No    Priority:  Medium      Overview Addendum 2024  6:51 PM by Mike Masters MD     Dating:   [x] Initial BMI:  21  [x] Prenatal Labs:  Reviewed  [x] Genetic Screening:  rrcfDNA screen  [x] Baby ASA:  Yes  [x] Anatomy US: Normal  [x] 1hr GCT at 24-28wks: Normal  [x] Tdap (27-36wks):  Done  [x] Flu Shot:  Done  [x] Rhogam (if Rh neg):  O positive  [x] GBS at 36 wks:  Negative  [x] Breastfeeding:  Yes  [] Postpartum Birth control method:   [x] 39 weeks discussion of IOL vs. Expectant management:  Discussed ARRIVE trial, hoping for spontaneous labor  [x] Labor:  Planning unmedicated birth, took hypnobirthing classes, aware of water immersion guidelines  [x] Mode of delivery: Vaginal                 Hospital course: no complications  Vaginal Birth  Patient is currently breastfeedingThe patient's blood type is O POS. The baby's blood type is O POS . Rhogam is not indicated.    Subjective   Her pain is well controlled with current medications  She is passing flatus  She is ambulating well  She is tolerating a Adult diet Regular  She reports no breast or nursing problems  She  denies emotional concerns today   Her plan for contraception is none         Objective   Allergies:   Patient has no known allergies.         Last Vitals:  Temp Pulse Resp BP MAP Pulse Ox   37.1 °C (98.8 °F) 82 18 100/57   96 %     Vitals Min/Max Last 24 Hours:  Temp  Min: 36.5 °C (97.7 °F)  Max: 37.4 °C (99.3 °F)  Pulse  Min: 80  Max: 137  Resp  Min: 16  Max: 20  BP  Min: 95/61  Max: 132/69    Intake/Output:     Intake/Output Summary (Last 24 hours) at 5/13/2024 0948  Last data filed at 5/12/2024 2130  Gross per 24 hour   Intake --   Output 125 ml   Net -125 ml       Physical Exam:  General: Examination reveals a well developed, well nourished, female, in no acute distress. She is alert and cooperative.  Fundus: firm, below umbilicus, and nontender.  Perineum: well approximated.  Psychological: awake and alert; oriented to person, place, and time.    Lab Data:  Labs in chart were reviewed.

## 2024-05-13 NOTE — CARE PLAN
Problem: Postpartum  Goal: Experiences normal postpartum course  Outcome: Progressing  Goal: Appropriate maternal -  bonding  Outcome: Progressing  Goal: No s/sx of hemorrhage  Outcome: Progressing   The patient's goals for the shift include rest    The clinical goals for the shift include meet postpartum milestones    Over the shift, the patient did make progress toward the following goals.

## 2024-05-14 VITALS
TEMPERATURE: 98.4 F | HEART RATE: 75 BPM | BODY MASS INDEX: 26.72 KG/M2 | HEIGHT: 63 IN | OXYGEN SATURATION: 98 % | SYSTOLIC BLOOD PRESSURE: 108 MMHG | RESPIRATION RATE: 18 BRPM | WEIGHT: 150.79 LBS | DIASTOLIC BLOOD PRESSURE: 75 MMHG

## 2024-05-14 ASSESSMENT — PAIN SCALES - GENERAL: PAINLEVEL_OUTOF10: 4

## 2024-05-14 NOTE — LACTATION NOTE
Lactation Consultant Note  Lactation Consultation  Reason for Consult: Follow-up assessment  Consultant Name: BRIDGET Smith    Maternal Information  Has mother  before?: No  Infant to breast within first 2 hours of birth?: Yes    Maternal Assessment  Breast Assessment: Medium, Soft, Compressible  Nipple Assessment: Intact  Areola Assessment: Normal    Infant Assessment  Infant Behavior: Readiness to feed, Feeding cues observed    Feeding Assessment  Nutrition Source: Breastmilk  Feeding Method: Nursing at the breast  Feeding Position: Football/seated  Suck/Feeding: Sustained  Latch Assessment: Deep latch obtained    LATCH TOOL  Latch: Grasps breast, tongue down, lips flanged, rhythmic sucking  Audible Swallowing: A few with stimulation  Type of Nipple: Everted (After stimulation)  Comfort (Breast/Nipple): Soft/non-tender  Hold (Positioning): Minimal assist, teach one side, mother does other, staff holds  LATCH Score: 8    Breast Pump       Other OB Lactation Tools       Patient Follow-up  Inpatient Lactation Follow-up Needed : Yes    Other OB Lactation Documentation       Recommendations/Summary  Called in for assistance with feed. Infant initially laying against mom and asleep. Infant taken to [facility] and diaper changed and infant stimulated. Infant now showing hunger cues. Infant placed in football hold and infant eagerly grasps onto the breast. Mom states strong suck but no pain. Discussed with patients keeping feeding log and diaper log for the first few weeks of life to help keep track of feedings and output. Discussed normal output for the first few days of infants life and to have at least 8 feeding sessions every 24 hours. Infant with 3 voids and 2 stools the last 24 hours and 5 BF and 1 attempt. Discussed number of feeds to encourage infant more frequently . Encouraged parents to call for assistance or questions.      -1200 In room to assist with sns for feed. Infant initially needed some assistance  latching on right breast. Infant was not opening mouth wide. Once latched Sns used. Infant switched to left breast and continued with sns system for 18 ml of donor milk for supplementation

## 2024-05-14 NOTE — DISCHARGE SUMMARY
Discharge Summary    Admission Date: 5/12/2024  Discharge Date: 5/14/24    Discharge Diagnosis  40 weeks gestation of pregnancy ([facility])    Hospital Course  Delivery Date: 5/12/2024  8:28 PM   Delivery type: Vaginal, Spontaneous    GA at delivery: 40w4d  Outcome: Living   Anesthesia during delivery: None   Intrapartum complications: None   Feeding method: Breastfeeding Status: Yes     Procedures: none  Contraception at discharge: declines  Nofar reports feeling well and ready for discharge to home today.   Reports bleeding is light and transitioning to brown.   Reports her cramping has been mild and she has been declining ibuprofen and tylenol, declines any homegoing meds.       Pertinent Physical Exam At Time of Discharge    General: Examination reveals a well developed, well nourished, female, in no acute distress. She is alert and cooperative.  Lungs: unlabored breathing.  Abdomen: soft, nontender.  Fundus: firm, below umbilicus, and nontender.  Perineum: well healing.  Extremities: no redness or tenderness in the calves or thighs, no edema.  Neurological: alert, oriented, normal speech, no focal findings or movement disorder noted.  Psychological: awake and alert; oriented to person, place, and time.    Discharge Meds     Your medication list        CONTINUE taking these medications        Instructions Last Dose Given Next Dose Due   omeprazole 20 mg DR capsule  Commonly known as: PriLOSEC      Take 1 capsule (20 mg) by mouth once daily. Do not crush or chew.       PRENATAL 2-IRON-FOLIC ACID-OM3 ORAL                     Complications Requiring Follow-Up  none    Test Results Pending At Discharge  Pending Labs       No current pending labs.            Outpatient Follow-Up  Future Appointments   Date Time Provider Department Center   6/13/2024  2:45 PM Mike Masters MD [id number]       I spent 20 minutes in the professional and overall care of this patient.      AGNES Mejia-ISABEL

## 2024-06-13 ENCOUNTER — APPOINTMENT (OUTPATIENT)
Dept: OBSTETRICS AND GYNECOLOGY | Facility: CLINIC | Age: 29
End: 2024-06-13
Payer: COMMERCIAL

## 2024-06-18 NOTE — PROGRESS NOTES
"Subjective   Cara Quezada is a 29 y.o.  who is here for a postpartum visit.  Baby was delivered by  on 2024, uncomplicated.  She is doing well.   She denies any symptoms of postpartum blues or depression.  She is breastfeeding with good milk supply.  Vaginal bleeding is light.  She has not had intercourse yet.        Objective   /72 (BP Location: Right arm, Patient Position: Sitting, BP Cuff Size: Adult)   Ht 1.6 m (5' 3\")   Wt 57.2 kg (126 lb)   LMP 2023 (Exact Date)   Breastfeeding Yes   BMI 22.32 kg/m²        General:   Alert and oriented, in no acute distress   Neck:    Breast/Axilla:    Abdomen: Soft, non-tender, without masses or organomegaly   Vulva: Normal architecture without erythema, masses, or lesions.    Vagina: Normal mucosa without lesions, masses, or atrophy. No abnormal vaginal discharge.    Cervix: Normal without masses, lesions, or signs of cervicitis.    Uterus: Normal mobile, non-enlarged uterus    Adnexa: Normal without masses or lesions   Pelvic Floor No POP noted. No high tone pelvic floor    Psych Normal affect. Normal mood.        Assessment/Plan   Doing well postpartum.   Continue prenatal vitamins while breastfeeding.  Okay to resume normal activities.  Planning condoms for birth control.  Follow up for annual exam.      "

## 2024-06-19 ENCOUNTER — APPOINTMENT (OUTPATIENT)
Dept: OBSTETRICS AND GYNECOLOGY | Facility: CLINIC | Age: 29
End: 2024-06-19
Payer: COMMERCIAL

## 2024-06-19 VITALS
HEIGHT: 63 IN | WEIGHT: 126 LBS | BODY MASS INDEX: 22.32 KG/M2 | SYSTOLIC BLOOD PRESSURE: 104 MMHG | DIASTOLIC BLOOD PRESSURE: 72 MMHG

## 2024-06-19 ASSESSMENT — EDINBURGH POSTNATAL DEPRESSION SCALE (EPDS)
I HAVE LOOKED FORWARD WITH ENJOYMENT TO THINGS: AS MUCH AS I EVER DID
I HAVE BEEN SO UNHAPPY THAT I HAVE BEEN CRYING: NO, NEVER
I HAVE BEEN SO UNHAPPY THAT I HAVE HAD DIFFICULTY SLEEPING: NOT AT ALL
I HAVE FELT SCARED OR PANICKY FOR NO GOOD REASON: NO, NOT AT ALL
I HAVE BEEN ABLE TO LAUGH AND SEE THE FUNNY SIDE OF THINGS: AS MUCH AS I ALWAYS COULD
I HAVE FELT SAD OR MISERABLE: NO, NOT AT ALL
I HAVE BEEN ANXIOUS OR WORRIED FOR NO GOOD REASON: HARDLY EVER
THE THOUGHT OF HARMING MYSELF HAS OCCURRED TO ME: NEVER
I HAVE BLAMED MYSELF UNNECESSARILY WHEN THINGS WENT WRONG: YES, SOME OF THE TIME
THINGS HAVE BEEN GETTING ON TOP OF ME: NO, I HAVE BEEN COPING AS WELL AS EVER
TOTAL SCORE: 3

## 2024-06-19 ASSESSMENT — ENCOUNTER SYMPTOMS
MUSCULOSKELETAL NEGATIVE: 0
GASTROINTESTINAL NEGATIVE: 0
CONSTITUTIONAL NEGATIVE: 0
PSYCHIATRIC NEGATIVE: 0
RESPIRATORY NEGATIVE: 0
NEUROLOGICAL NEGATIVE: 0
CARDIOVASCULAR NEGATIVE: 0
EYES NEGATIVE: 0
ALLERGIC/IMMUNOLOGIC NEGATIVE: 0
HEMATOLOGIC/LYMPHATIC NEGATIVE: 0
ENDOCRINE NEGATIVE: 0

## 2024-06-19 ASSESSMENT — PAIN SCALES - GENERAL: PAINLEVEL: 0-NO PAIN

## 2024-08-09 ENCOUNTER — PATIENT MESSAGE (OUTPATIENT)
Dept: OBSTETRICS AND GYNECOLOGY | Facility: CLINIC | Age: 29
End: 2024-08-09
Payer: COMMERCIAL

## 2024-08-09 ENCOUNTER — TELEPHONE (OUTPATIENT)
Dept: OBSTETRICS AND GYNECOLOGY | Facility: CLINIC | Age: 29
End: 2024-08-09
Payer: COMMERCIAL

## 2024-08-09 DIAGNOSIS — Z71.9 ENCOUNTER FOR COUNSELING: ICD-10-CM

## 2024-08-09 NOTE — TELEPHONE ENCOUNTER
Returned patient's call  Identified by name and   Patient states this is anxiety related, denies any depression, SI or HI at this time.   Patient wants to discuss anxiety and changes in post-partum.  Informed patient referral order is placed and she may call to schedule whenever. Informed patient Dr Masters did not state any specific recommendations for providers at this time.  Patient verbalized understanding, no questions/concerns at this time.    NATASHA Saez RN      NATASHA Saez RN

## 2024-08-09 NOTE — TELEPHONE ENCOUNTER
Called patient to discuss MyChart message  Left vm for patient to return call.    KAYLI SaezN RN

## 2024-08-28 ENCOUNTER — APPOINTMENT (OUTPATIENT)
Dept: BEHAVIORAL HEALTH | Facility: CLINIC | Age: 29
End: 2024-08-28
Payer: COMMERCIAL

## 2024-08-28 DIAGNOSIS — F41.1 GENERALIZED ANXIETY DISORDER: Primary | ICD-10-CM

## 2024-08-28 DIAGNOSIS — Z71.9 ENCOUNTER FOR COUNSELING: ICD-10-CM

## 2024-08-28 PROCEDURE — 90791 PSYCH DIAGNOSTIC EVALUATION: CPT | Performed by: PSYCHIATRY & NEUROLOGY

## 2024-08-28 NOTE — PROGRESS NOTES
Time: 1:00 PM to 1:43 PM                                         Address: [address] [place] [address]  Email: [email or web address]  Ph. #: [phone]  : 1995                                          SSN: [id number]    Patient is a 29-year-old, , Japanese, female who presents for life issues.    Patient reported that she was born and raised in [place], by her biological mother and father; patient reported that her mom was submissive and dad yell because he was in the [employer]; patient reported dad is now more chill; patient reported that her childhood consisted of playing outside with friends and going to friend's homes and watching TV, and outside exploring; patient reported that she was teased and called bad names and was made to feel less than; patient denied any developmental issues.    Patient denied any medical or mental health history from her mother side of the family, Oliva 53, who she has a good relationship; patient reported a history of heart issues and diabetes from her father side of the family, Kishor 56, and they have a good relationship; patient has 2 siblings, Abdullahi 33, who she does not talk with and Adir 19, who she is closest to; patient reported that she lives with her , Sen Monsivais 34, and they have a good marriage; patient reported that they have been together for 7-1/2 years and  for 2 years; patient reported that they had a son named Sanchez who is 3 months old; patient reported that her  is an American nurse and works in the ICU at .    Patient reported that her Quaker spiritual orientation is Jew; patient reported her primary emotional support and best friend is Sen; patient reported she drinks 1 cup of coffee daily and denies any alcohol, tobacco, or substance use; patient reported that she enjoys drawing and hiking; patient describes herself as caring, loving, hard worker, kayaking, and optimistic; patient reported they have 1 dog  in the house; patient reported that she is a high school graduate and has a bachelor's degree in environmental science; patient reported that she is currently a homemaker but would like to go back to work as she likes her job research and is real; patient denied any psychiatric hospitalizations or abuse; patient reported that her most traumatic experience was the recent passing of her aunt who is only 62.    Symptoms:     5. Psychomotor retardation/agitation (feel like you're moving slow)   6. Decreased physical energy/fatigue      7. Feel Worthless         Excessive or inappropriate guilt      9. Preoccupied with death & dying-patient reported because of her aunt's passing which is 1 week before her son was born, and they were very close.  Unresolved grief issues        Anxiety:   Worries about: Patient reported her future, her family, her mom and dad, her  and her son, and herself  Excessive anxiety   Hard to control   1. Restlessness   2. Easily fatigued   3. Difficulty concentrating or mind goes blank   4. Irritability   5. Muscle Tension      Racing thoughts            MSE  General appearance & grooming: Appropriate      Motor activity:  Appropriate               Behavior: Cooperative     Restless              Adaptive Equipment: na  Speech:      Other - heavy accent.  Mood:   Depressed/Flat     Anxious    Affect:     Constricted              Thought process:     Racing    Indecisive      Thought content:   Preoccupied      Worthless    Cognition: No impairment, Orientation: Alert & Oriented x 3  Insight:  Aware of problem       Eye contact: Average         Judgment: Judgment intact         Interview behavior: Appropriate       Hallucinations: None      Delusions: Absent         Conclusion  Reason for Care: Based on the information provided by the patient, she meets the DSM-5-TR, ICD-11 diagnosis of F41.1, Generalized Anxiety Disorder.  Method of Therapy: Diagnostic Assessment  Therapy Summary: Pt. was  open and cooperative in providing the necessary information to complete their assessment.  Identified Goals and Objectives: To reduce anxiety and increase healthier coping skills.  Response to Therapy: Pt. was engaged.  Treatment Plan and Process: Continue with above stated goals; follow up 2 weeks.

## 2024-09-05 ENCOUNTER — APPOINTMENT (OUTPATIENT)
Dept: BEHAVIORAL HEALTH | Facility: CLINIC | Age: 29
End: 2024-09-05
Payer: COMMERCIAL

## 2024-09-11 ENCOUNTER — APPOINTMENT (OUTPATIENT)
Dept: BEHAVIORAL HEALTH | Facility: CLINIC | Age: 29
End: 2024-09-11
Payer: COMMERCIAL

## 2024-09-11 DIAGNOSIS — F41.1 GENERALIZED ANXIETY DISORDER: Primary | ICD-10-CM

## 2024-09-11 PROCEDURE — 90837 PSYTX W PT 60 MINUTES: CPT | Performed by: PSYCHIATRY & NEUROLOGY

## 2024-09-25 ENCOUNTER — APPOINTMENT (OUTPATIENT)
Dept: BEHAVIORAL HEALTH | Facility: CLINIC | Age: 29
End: 2024-09-25
Payer: COMMERCIAL

## 2024-09-30 ENCOUNTER — APPOINTMENT (OUTPATIENT)
Dept: BEHAVIORAL HEALTH | Facility: CLINIC | Age: 29
End: 2024-09-30
Payer: COMMERCIAL

## 2024-09-30 DIAGNOSIS — F41.1 GENERALIZED ANXIETY DISORDER: Primary | ICD-10-CM

## 2024-09-30 PROCEDURE — 90837 PSYTX W PT 60 MINUTES: CPT | Performed by: PSYCHIATRY & NEUROLOGY

## 2024-09-30 NOTE — PROGRESS NOTES
Time: 11:02 AM to 11:57 AM  Reason for Care: F41.1, generalized anxiety disorder.  Method of Therapy: Cognitive therapy; supportive and encouraging.  Therapy Summary: Pt. was dysthymic and her affect was mood congruent; pt. reported that she went to [place] and had a good family time as she was able to enjoy some authentic is rarely food and time with family, but that made her long to be back in [place]; patient reported that she and her  had talked about her getting a masters, although he does not seem to fully support her as he says that she needs to be specific and what career path she wants to pursue; patient discussed and processed her thoughts and feelings about the self-directed search and agreed to take it and share her report with this therapist at her next appointment; patient began to work on learning and understanding what assertiveness is and certain myths about being assertive; Provider provided patient with a safe place to freely share their issues and concerns openly; Provider offered active listening and empathic understanding to build trust and foster communication; Provider utilized socratic questioning to uncover assumptions, open up issues and problems, and cause client to think deeper; Provider assisted patient with discussing and processing their thoughts and feelings about the self-directed search and assertiveness; Provider supported and encouraged client.  Identified Goals and Objectives: Processing distressing thoughts and implementing healthier coping skills.  Response to Therapy: Pt. was engaged.  Treatment Plan and Process: Continue with above stated goals; follow up 3 weeks.

## 2024-10-01 NOTE — PROGRESS NOTES
Time: 1:00 PM to 1:56 PM  Reason for Care: F41.1, generalized anxiety disorder.  Method of Therapy: Cognitive therapy; supportive and encouraging.  Therapy Summary: Pt. was dysthymic and her affect was mood congruent; pt. reported that she is okay but a little sad as she feels like she misses the old her; patient shared her thoughts and feelings about being able to visit her mom and dad; patient openly shared about how she was a  in [place] and was also in the [employer]; patient shared how she was close to her and from is real who lived in [place] and recently passed away; patient openly shared her thoughts and feelings on pursuing her masters in environmental science but she and her  do not agree on this; Provider offered patient with a safe place to freely share their issues and concerns openly; Provider offered unconditional positive regard and acceptance to build trust and foster communication; Provider utilized socratic questioning to uncover assumptions, open up issues and problems, and cause client to think deeper.   Identified Goals and Objectives: Processing distressing thoughts and implementing healthier coping skills.  Response to Therapy: Pt. was engaged.  Treatment Plan and Process: Continue with above stated goals; follow up 2 weeks.

## 2024-10-09 ENCOUNTER — APPOINTMENT (OUTPATIENT)
Dept: BEHAVIORAL HEALTH | Facility: CLINIC | Age: 29
End: 2024-10-09
Payer: COMMERCIAL

## 2024-10-21 PROBLEM — Z01.419 ENCOUNTER FOR ANNUAL ROUTINE GYNECOLOGICAL EXAMINATION: Status: ACTIVE | Noted: 2024-10-21

## 2024-10-21 NOTE — PROGRESS NOTES
"Subjective    Cara Quezada is a 29 y.o. female who is here for a routine exam. Periods are regular. No intermenstrual bleeding, spotting, or discharge.    Baby born 5/2024.  Still breastfeeding.     Current contraception: \"pull out method\"  Last pap: 10/2023 Negative  Last mammogram:  n/a    Review of Systems  Constitutional: no fever, no chills, no recent weight gain, no recent weight loss and no fatigue.   Eyes: no eye pain, no vision problems and no dryness of the eyes.   ENT: no hearing loss, no nosebleeds and no sinus congestion.   Cardiovascular: no chest pain, no palpitations and no orthopnea.   Respiratory: no shortness of breath, no cough and no wheezing.   Gastrointestinal: no abdominal pain, no constipation, no nausea, no diarrhea and no vomiting.   Genitourinary: no dysuria, no urinary incontinence, no vaginal dryness, no vaginal itching, no dyspareunia, no pelvic pain, no dysmenorrhea, no sexual problems, no change in urinary frequency, no vaginal discharge, no unexplained vaginal bleeding and no lesion/sore.   Musculoskeletal: no back pain, no joint swelling and no leg edema.   Integumentary: no rashes, no skin lesions, no nipple discharge, no breast pain and no breast lump.   Neurological: no headache, no numbness and no dizziness.   Psychiatric: no sleep disturbances, no anxiety and no depression.   Endocrine: no hot flashes, no loss of hair and no hirsutism.   Hematologic/Lymphatic: no swollen glands, no tendency for easy bleeding and no tendency for easy bruising.       Objective   /75   Ht 1.6 m (5' 3\")   Wt 53.1 kg (117 lb)   LMP 10/16/2024   Breastfeeding Yes   BMI 20.73 kg/m²        General:   Alert and oriented, in no acute distress   Neck: Supple. No visible thyromegaly.    Breast/Axilla: Normal to palpation bilaterally without masses, skin changes, or nipple discharge.    Abdomen: Soft, non-tender, without masses or organomegaly   Vulva: Normal architecture without erythema, " masses, or lesions.    Vagina: Normal mucosa without lesions, masses, or atrophy. No abnormal vaginal discharge.    Cervix: Normal without masses, lesions, or signs of cervicitis.    Uterus: Normal mobile, non-enlarged uterus    Adnexa: Normal without masses or lesions   Pelvic Floor No POP noted. No high tone pelvic floor    Psych Normal affect. Normal mood.        Assessment/Plan   Annual exam - discussed diet, exercise, self breast awareness, mammogram and pap guidelines.

## 2024-10-23 ENCOUNTER — APPOINTMENT (OUTPATIENT)
Dept: OBSTETRICS AND GYNECOLOGY | Facility: CLINIC | Age: 29
End: 2024-10-23
Payer: COMMERCIAL

## 2024-10-23 ENCOUNTER — APPOINTMENT (OUTPATIENT)
Dept: BEHAVIORAL HEALTH | Facility: CLINIC | Age: 29
End: 2024-10-23
Payer: COMMERCIAL

## 2024-10-23 VITALS
WEIGHT: 117 LBS | HEIGHT: 63 IN | SYSTOLIC BLOOD PRESSURE: 113 MMHG | BODY MASS INDEX: 20.73 KG/M2 | DIASTOLIC BLOOD PRESSURE: 75 MMHG

## 2024-10-23 DIAGNOSIS — Z01.419 ENCOUNTER FOR ANNUAL ROUTINE GYNECOLOGICAL EXAMINATION: Primary | ICD-10-CM

## 2024-10-23 PROCEDURE — 99395 PREV VISIT EST AGE 18-39: CPT | Performed by: OBSTETRICS & GYNECOLOGY

## 2024-10-23 PROCEDURE — 3008F BODY MASS INDEX DOCD: CPT | Performed by: OBSTETRICS & GYNECOLOGY

## 2024-10-23 PROCEDURE — 1036F TOBACCO NON-USER: CPT | Performed by: OBSTETRICS & GYNECOLOGY

## 2024-12-10 ENCOUNTER — APPOINTMENT (OUTPATIENT)
Dept: PRIMARY CARE | Facility: CLINIC | Age: 29
End: 2024-12-10
Payer: COMMERCIAL

## 2024-12-10 ENCOUNTER — APPOINTMENT (OUTPATIENT)
Dept: LAB | Facility: LAB | Age: 29
End: 2024-12-10
Payer: COMMERCIAL

## 2024-12-10 VITALS
SYSTOLIC BLOOD PRESSURE: 101 MMHG | TEMPERATURE: 98 F | DIASTOLIC BLOOD PRESSURE: 63 MMHG | HEART RATE: 98 BPM | BODY MASS INDEX: 20.98 KG/M2 | HEIGHT: 63 IN | WEIGHT: 118.4 LBS | OXYGEN SATURATION: 97 %

## 2024-12-10 DIAGNOSIS — Z82.49 FAMILY HISTORY OF MI (MYOCARDIAL INFARCTION): ICD-10-CM

## 2024-12-10 DIAGNOSIS — L40.9 PSORIASIS: ICD-10-CM

## 2024-12-10 DIAGNOSIS — Z23 IMMUNIZATION DUE: ICD-10-CM

## 2024-12-10 DIAGNOSIS — Z00.00 ENCOUNTER FOR PREVENTATIVE ADULT HEALTH CARE EXAMINATION: Primary | ICD-10-CM

## 2024-12-10 DIAGNOSIS — F33.1 MODERATE EPISODE OF RECURRENT MAJOR DEPRESSIVE DISORDER: ICD-10-CM

## 2024-12-10 PROBLEM — F32.1 CURRENT MODERATE EPISODE OF MAJOR DEPRESSIVE DISORDER (MULTI): Status: ACTIVE | Noted: 2024-12-10

## 2024-12-10 PROBLEM — Z3A.40 40 WEEKS GESTATION OF PREGNANCY (HHS-HCC): Status: RESOLVED | Noted: 2024-05-12 | Resolved: 2024-12-10

## 2024-12-10 PROBLEM — Z01.419 ENCOUNTER FOR ANNUAL ROUTINE GYNECOLOGICAL EXAMINATION: Status: RESOLVED | Noted: 2024-10-21 | Resolved: 2024-12-10

## 2024-12-10 PROCEDURE — 99395 PREV VISIT EST AGE 18-39: CPT | Performed by: INTERNAL MEDICINE

## 2024-12-10 PROCEDURE — 90471 IMMUNIZATION ADMIN: CPT | Performed by: INTERNAL MEDICINE

## 2024-12-10 PROCEDURE — 1036F TOBACCO NON-USER: CPT | Performed by: INTERNAL MEDICINE

## 2024-12-10 PROCEDURE — 90656 IIV3 VACC NO PRSV 0.5 ML IM: CPT | Performed by: INTERNAL MEDICINE

## 2024-12-10 PROCEDURE — 3008F BODY MASS INDEX DOCD: CPT | Performed by: INTERNAL MEDICINE

## 2024-12-10 ASSESSMENT — PATIENT HEALTH QUESTIONNAIRE - PHQ9
4. FEELING TIRED OR HAVING LITTLE ENERGY: SEVERAL DAYS
SUM OF ALL RESPONSES TO PHQ QUESTIONS 1-9: 10
7. TROUBLE CONCENTRATING ON THINGS, SUCH AS READING THE NEWSPAPER OR WATCHING TELEVISION: SEVERAL DAYS
1. LITTLE INTEREST OR PLEASURE IN DOING THINGS: SEVERAL DAYS
2. FEELING DOWN, DEPRESSED OR HOPELESS: NEARLY EVERY DAY
9. THOUGHTS THAT YOU WOULD BE BETTER OFF DEAD, OR OF HURTING YOURSELF: NOT AT ALL
10. IF YOU CHECKED OFF ANY PROBLEMS, HOW DIFFICULT HAVE THESE PROBLEMS MADE IT FOR YOU TO DO YOUR WORK, TAKE CARE OF THINGS AT HOME, OR GET ALONG WITH OTHER PEOPLE: SOMEWHAT DIFFICULT
3. TROUBLE FALLING OR STAYING ASLEEP: SEVERAL DAYS
8. MOVING OR SPEAKING SO SLOWLY THAT OTHER PEOPLE COULD HAVE NOTICED. OR THE OPPOSITE, BEING SO FIGETY OR RESTLESS THAT YOU HAVE BEEN MOVING AROUND A LOT MORE THAN USUAL: SEVERAL DAYS
SUM OF ALL RESPONSES TO PHQ9 QUESTIONS 1 & 2: 4
5. POOR APPETITE OR OVEREATING: SEVERAL DAYS
6. FEELING BAD ABOUT YOURSELF - OR THAT YOU ARE A FAILURE OR HAVE LET YOURSELF OR YOUR FAMILY DOWN: SEVERAL DAYS

## 2024-12-10 ASSESSMENT — PAIN SCALES - GENERAL: PAINLEVEL_OUTOF10: 0-NO PAIN

## 2024-12-10 NOTE — PROGRESS NOTES
"Subjective     Patient ID: Cara Quezada is a 29 y.o. female who presents for Annual Exam.  HPI    29F here for preventive care visit, last seen 12/22. She has since given birth to a baby boy Sanchez on 5/14! No known complications.     PMHx:   - Depression/anxiety  -in the setting of multiple life stressors   - Psoriasis - stress induced uses topical tx has an appointment this week with dermatology,   - Early family history of MI - father at 53, maternal uncle in 40s      Social:   - History of remote social hookah use, no alcohol, no illicits  - Studied environmental science   -  's family in [place] and [place], majority of family in [facility] [place] cousin in [facility]     Lifestyle   - Diet - overall healthy food, periods of snacking at [facility]  - Exercise - walks with her son, otherwise not as much. During pregnancy was working out every day.   - Sleep - wakes up to feed son 2 times a night.       Objective       Current Outpatient Medications:     PRENATAL 2-IRON-FOLIC ACID-OM3 ORAL, Take by mouth., Disp: , Rfl:       /63   Pulse 98   Temp 36.7 °C (98 °F) (Temporal)   Ht 1.61 m (5' 3.39\")   Wt 53.7 kg (118 lb 6.4 oz)   SpO2 97%   Breastfeeding Yes   BMI 20.72 kg/m²       Physical Examination:   General: Awake, alert, appears stated age, accompanied by [place]!   Head/eyes/ears: NCAT, EOMI, PERRL, TM WNL, no cerumen  Throat: moist mucus membranes, no pharyngeal erythema  Neck: Supple, nontender, no lymphadenopathy, thyroid exam unremarkable   Breast exam: Declined  Heart: RRR, no murmurs, rubs or gallops  Lungs: CTA bilaterally, no rhonchi rales or wheezes   Abdomen: Soft, otherwise declined   Extremities: No edema, 2+ DP pulses   Skin: No concerning skin lesions on visualized skin   Neuro: AAO x 3, no FND, gait unremarkable    Assessment/Plan         Assessment & Plan  Encounter for preventative adult health care examination  Adult Health Examination  Age appropriate screening performed   Healthy " lifestyle reviewed.   No additional pertinent family history or toxic habits   Cancer screening:   - Colonoscopy at 44   - Mammogram at 39 discussed self breast exams had breast exam recently   - Pap smear 10/23 followed by Dr. Masters   - Skin cancer prevention strategies reviewed   Immunizations   - Due: Flu shot today, Hep B deferred, covid declined   - UTD: Tdap   Dental and visual examinations scheduled   Discussed adequate Vitamin D intake   Orders:    Comprehensive Metabolic Panel; Future    CBC and Auto Differential; Future    Hemoglobin A1C; Future    TSH with reflex to Free T4 if abnormal; Future    Lipid Panel; Future    Psoriasis  New onset after her covid vaccine   Not interested in additional treatment, to see dermatology upcoming visit scheduled          Moderate episode of recurrent major depressive disorder  Recurrent symptoms without SI/HI  Declines medical management would be amenable to behavioral health referral  Some component of seasonal affective disorder-advised bright light therapy  Orders:    Follow Up In [facility]; Future    Family history of MI (myocardial infarction)  Obtain lipid profile, consider further restratification based on results       Immunization due    Orders:    Flu vaccine, trivalent, preservative free, age 6 months and greater (Fluraix/Fluzone/Flulaval)    Follow-up 6 months

## 2024-12-10 NOTE — PATIENT INSTRUCTIONS
Nofar,   Labs including bloodwork and/or urine is ordered today. The lab can be found on this floor (2nd floor) next to the pharmacy across from the elevators.    Depression - referral to our behavioral health person named Alejandra. She should be reaching out to you.   Seasonal affective disorder - purchase a bright light 10,000 lux and sit under it for 30-60 minutes first thing in the morning     Followup 6 months

## 2024-12-10 NOTE — ASSESSMENT & PLAN NOTE
New onset after her covid vaccine   Not interested in additional treatment, to see dermatology upcoming visit scheduled

## 2024-12-10 NOTE — ASSESSMENT & PLAN NOTE
Recurrent symptoms without SI/HI  Declines medical management would be amenable to behavioral health referral  Some component of seasonal affective disorder-advised bright light therapy  Orders:    Follow Up In [facility]; Future

## 2024-12-12 ENCOUNTER — APPOINTMENT (OUTPATIENT)
Dept: DERMATOLOGY | Facility: CLINIC | Age: 29
End: 2024-12-12
Payer: COMMERCIAL

## 2024-12-12 DIAGNOSIS — L40.0 PSORIASIS VULGARIS: Primary | ICD-10-CM

## 2024-12-12 PROCEDURE — 99214 OFFICE O/P EST MOD 30 MIN: CPT | Performed by: DERMATOLOGY

## 2024-12-12 PROCEDURE — 1036F TOBACCO NON-USER: CPT | Performed by: DERMATOLOGY

## 2024-12-12 ASSESSMENT — DERMATOLOGY QUALITY OF LIFE (QOL) ASSESSMENT
ARE THERE EXCLUSIONS OR EXCEPTIONS FOR THE QUALITY OF LIFE ASSESSMENT: NO
WHAT SINGLE SKIN CONDITION LISTED BELOW IS THE PATIENT ANSWERING THE QUALITY-OF-LIFE ASSESSMENT QUESTIONS ABOUT: PSORIASIS
RATE HOW EMOTIONALLY BOTHERED YOU ARE BY YOUR SKIN PROBLEM (FOR EXAMPLE, WORRY, EMBARRASSMENT, FRUSTRATION): 3
RATE HOW BOTHERED YOU ARE BY EFFECTS OF YOUR SKIN PROBLEMS ON YOUR ACTIVITIES (EG, GOING OUT, ACCOMPLISHING WHAT YOU WANT, WORK ACTIVITIES OR YOUR RELATIONSHIPS WITH OTHERS): 2
RATE HOW BOTHERED YOU ARE BY SYMPTOMS OF YOUR SKIN PROBLEM (EG, ITCHING, STINGING BURNING, HURTING OR SKIN IRRITATION): 3

## 2024-12-12 ASSESSMENT — DERMATOLOGY PATIENT ASSESSMENT
DO YOU USE A TANNING BED: NO
DO YOU USE SUNSCREEN: OCCASIONALLY
ARE YOU AN ORGAN TRANSPLANT RECIPIENT: NO
FOR PATIENTS COMING IN FOR A FOLLOW-UP VISIT - HAVE THERE BEEN ANY CHANGES IN YOUR HEALTH SINCE YOUR LAST VISIT: ALL IN MYCHART
DO YOU HAVE ANY NEW OR CHANGING LESIONS: NO

## 2024-12-12 ASSESSMENT — PATIENT GLOBAL ASSESSMENT (PGA): PATIENT GLOBAL ASSESSMENT: PATIENT GLOBAL ASSESSMENT:  2 - MILD

## 2024-12-12 ASSESSMENT — ITCH NUMERIC RATING SCALE: HOW SEVERE IS YOUR ITCHING?: 4

## 2024-12-12 NOTE — PROGRESS NOTES
Subjective     Cara Quezada is a 29 y.o. female who presents for the following: Psoriasis (Pt here for Psoriasis, located on scalp/neck, ears, arms, inside nose. No current tx. Reports since having a baby Psoriasis has worsened. Pt previously used Ketoconazole 2% shampoo, Clobetasol 0.05 % scalp solution in the past. Pt would like to discuss treatment options that are not steroids. Pt is currently breastfeeding. ).     Review of Systems:  No other skin or systemic complaints other than what is documented elsewhere in the note.    The following portions of the chart were reviewed this encounter and updated as appropriate:          Skin Cancer History  No skin cancer on file.      Specialty Problems          Dermatology Problems    Psoriasis        Objective   Well appearing patient in no apparent distress; mood and affect are within normal limits.    A focused skin examination was performed of the face, scalp, ears, upper extremities. All findings within normal limits unless otherwise noted below.    Assessment/Plan   1. Psoriasis vulgaris - Chronic, flaring  Left Ear, Left Forearm - Posterior, Posterior Mid Neck, Right Ear, Right Forearm - Posterior  Well-demarcated erythematous papules and plaques with overlying silvery scale.    The chronic and intermittently flaring nature of this skin condition was discussed today.   This is an inflammatory skin condition that occurs in a bimodal age distribution in genetically pre-disposed individuals. The inflammatory skin condition causes skin to turn over 2x faster than normal skin. The various treatment options were reviewed which include: topical steroids, topical retinoids, nb-UVB therapy, systemic medications including oral medications and injectable (biologic) medications.     Patient does not want to continue topical steroid therapy.    Patient wants to pursue other therapies, due to breast feeding, wanting to have more children in the future do not recommend systemic  or biologic therapy at this time.    Start Vtama cream apply to affected areas once daily    Follow up if not improving or worsening after 2-3 months, otherwise as needed.    Related Medications  tapinarof 1 % cream  Apply 1 Application topically once daily.

## 2024-12-13 ENCOUNTER — SPECIALTY PHARMACY (OUTPATIENT)
Dept: PHARMACY | Facility: CLINIC | Age: 29
End: 2024-12-13

## 2025-03-19 LAB
ALBUMIN SERPL-MCNC: 4.5 G/DL (ref 3.6–5.1)
ALP SERPL-CCNC: 77 U/L (ref 31–125)
ALT SERPL-CCNC: 11 U/L (ref 6–29)
ANION GAP SERPL CALCULATED.4IONS-SCNC: 8 MMOL/L (CALC) (ref 7–17)
AST SERPL-CCNC: 15 U/L (ref 10–30)
BASOPHILS # BLD AUTO: 23 CELLS/UL (ref 0–200)
BASOPHILS NFR BLD AUTO: 0.3 %
BILIRUB SERPL-MCNC: 0.4 MG/DL (ref 0.2–1.2)
BUN SERPL-MCNC: 13 MG/DL (ref 7–25)
CALCIUM SERPL-MCNC: 9.1 MG/DL (ref 8.6–10.2)
CHLORIDE SERPL-SCNC: 103 MMOL/L (ref 98–110)
CHOLEST SERPL-MCNC: 200 MG/DL
CHOLEST/HDLC SERPL: 2.6 (CALC)
CO2 SERPL-SCNC: 27 MMOL/L (ref 20–32)
CREAT SERPL-MCNC: 0.53 MG/DL (ref 0.5–0.97)
EGFRCR SERPLBLD CKD-EPI 2021: 128 ML/MIN/1.73M2
EOSINOPHIL # BLD AUTO: 100 CELLS/UL (ref 15–500)
EOSINOPHIL NFR BLD AUTO: 1.3 %
ERYTHROCYTE [DISTWIDTH] IN BLOOD BY AUTOMATED COUNT: 12.1 % (ref 11–15)
EST. AVERAGE GLUCOSE BLD GHB EST-MCNC: 94 MG/DL
EST. AVERAGE GLUCOSE BLD GHB EST-SCNC: 5.2 MMOL/L
GLUCOSE SERPL-MCNC: 96 MG/DL (ref 65–139)
HBA1C MFR BLD: 4.9 % OF TOTAL HGB
HCT VFR BLD AUTO: 41.1 % (ref 35–45)
HDLC SERPL-MCNC: 78 MG/DL
HGB BLD-MCNC: 13.6 G/DL (ref 11.7–15.5)
LDLC SERPL CALC-MCNC: 105 MG/DL (CALC)
LYMPHOCYTES # BLD AUTO: 1863 CELLS/UL (ref 850–3900)
LYMPHOCYTES NFR BLD AUTO: 24.2 %
MCH RBC QN AUTO: 29.1 PG (ref 27–33)
MCHC RBC AUTO-ENTMCNC: 33.1 G/DL (ref 32–36)
MCV RBC AUTO: 87.8 FL (ref 80–100)
MONOCYTES # BLD AUTO: 393 CELLS/UL (ref 200–950)
MONOCYTES NFR BLD AUTO: 5.1 %
NEUTROPHILS # BLD AUTO: 5321 CELLS/UL (ref 1500–7800)
NEUTROPHILS NFR BLD AUTO: 69.1 %
NONHDLC SERPL-MCNC: 122 MG/DL (CALC)
PLATELET # BLD AUTO: 256 THOUSAND/UL (ref 140–400)
PMV BLD REES-ECKER: 10.8 FL (ref 7.5–12.5)
POTASSIUM SERPL-SCNC: 4 MMOL/L (ref 3.5–5.3)
PROT SERPL-MCNC: 6.9 G/DL (ref 6.1–8.1)
RBC # BLD AUTO: 4.68 MILLION/UL (ref 3.8–5.1)
SODIUM SERPL-SCNC: 138 MMOL/L (ref 135–146)
TRIGL SERPL-MCNC: 80 MG/DL
TSH SERPL-ACNC: 0.85 MIU/L
WBC # BLD AUTO: 7.7 THOUSAND/UL (ref 3.8–10.8)

## 2025-05-20 ENCOUNTER — TELEPHONE (OUTPATIENT)
Dept: OBSTETRICS AND GYNECOLOGY | Facility: CLINIC | Age: 30
End: 2025-05-20
Payer: COMMERCIAL

## 2025-05-20 NOTE — TELEPHONE ENCOUNTER
Cara Quezada sent over CRM in regards to her upcoming appointment. Patient has an appointment on 6/11/25 with Dr. Masters. Stated that she took pregnancy test and is came back that she is pregnant but would like a Ultrasound order to make sure she is pregnant because she is still unsure. LMP 3/18/25.    Kristi Forbes MA

## 2025-05-21 NOTE — TELEPHONE ENCOUNTER
Called patient, left voice message to call the office back regarding this request.  Melina Meza LPN

## 2025-05-23 ENCOUNTER — INITIAL PRENATAL (OUTPATIENT)
Dept: OBSTETRICS AND GYNECOLOGY | Facility: CLINIC | Age: 30
End: 2025-05-23
Payer: COMMERCIAL

## 2025-05-23 ENCOUNTER — APPOINTMENT (OUTPATIENT)
Dept: LAB | Facility: HOSPITAL | Age: 30
End: 2025-05-23
Payer: COMMERCIAL

## 2025-05-23 VITALS — WEIGHT: 123.8 LBS | SYSTOLIC BLOOD PRESSURE: 102 MMHG | DIASTOLIC BLOOD PRESSURE: 68 MMHG | BODY MASS INDEX: 21.66 KG/M2

## 2025-05-23 DIAGNOSIS — Z34.90 PREGNANCY, UNSPECIFIED GESTATIONAL AGE (HHS-HCC): Primary | ICD-10-CM

## 2025-05-23 PROBLEM — Z34.92 ENCOUNTER FOR PREGNANCY RELATED EXAMINATION IN SECOND TRIMESTER: Status: RESOLVED | Noted: 2023-12-13 | Resolved: 2025-05-23

## 2025-05-23 PROBLEM — F41.9 ANXIETY AND DEPRESSION: Status: ACTIVE | Noted: 2025-05-23

## 2025-05-23 PROBLEM — L40.0 PSORIASIS VULGARIS: Status: ACTIVE | Noted: 2022-11-11

## 2025-05-23 PROBLEM — F32.A ANXIETY AND DEPRESSION: Status: ACTIVE | Noted: 2025-05-23

## 2025-05-23 LAB
ABO GROUP (TYPE) IN BLOOD: NORMAL
ANTIBODY SCREEN: NORMAL
ERYTHROCYTE [DISTWIDTH] IN BLOOD BY AUTOMATED COUNT: 12.1 % (ref 11.5–14.5)
HCT VFR BLD AUTO: 38.5 % (ref 36–46)
HGB BLD-MCNC: 13.1 G/DL (ref 12–16)
MCH RBC QN AUTO: 28.5 PG (ref 26–34)
MCHC RBC AUTO-ENTMCNC: 34 G/DL (ref 32–36)
MCV RBC AUTO: 84 FL (ref 80–100)
NRBC BLD-RTO: 0 /100 WBCS (ref 0–0)
PLATELET # BLD AUTO: 273 X10*3/UL (ref 150–450)
RBC # BLD AUTO: 4.59 X10*6/UL (ref 4–5.2)
REFLEX ADDED, ANEMIA PANEL: NORMAL
RH FACTOR (ANTIGEN D): NORMAL
WBC # BLD AUTO: 8.4 X10*3/UL (ref 4.4–11.3)

## 2025-05-23 PROCEDURE — 86901 BLOOD TYPING SEROLOGIC RH(D): CPT

## 2025-05-23 PROCEDURE — 86850 RBC ANTIBODY SCREEN: CPT

## 2025-05-23 PROCEDURE — 85027 COMPLETE CBC AUTOMATED: CPT

## 2025-05-23 PROCEDURE — 83020 HEMOGLOBIN ELECTROPHORESIS: CPT

## 2025-05-23 PROCEDURE — 83021 HEMOGLOBIN CHROMOTOGRAPHY: CPT

## 2025-05-23 PROCEDURE — 86900 BLOOD TYPING SEROLOGIC ABO: CPT

## 2025-05-23 PROCEDURE — 0500F INITIAL PRENATAL CARE VISIT: CPT | Performed by: STUDENT IN AN ORGANIZED HEALTH CARE EDUCATION/TRAINING PROGRAM

## 2025-05-23 RX ORDER — NAPROXEN SODIUM 220 MG/1
162 TABLET, FILM COATED ORAL DAILY
Qty: 180 TABLET | Refills: 3 | Status: SHIPPED | OUTPATIENT
Start: 2025-05-23 | End: 2026-05-23

## 2025-05-23 ASSESSMENT — EDINBURGH POSTNATAL DEPRESSION SCALE (EPDS)
I HAVE FELT SCARED OR PANICKY FOR NO GOOD REASON: NO, NOT AT ALL
I HAVE BEEN ANXIOUS OR WORRIED FOR NO GOOD REASON: NO, NOT AT ALL
I HAVE BEEN SO UNHAPPY THAT I HAVE HAD DIFFICULTY SLEEPING: YES, MOST OF THE TIME
I HAVE BEEN ABLE TO LAUGH AND SEE THE FUNNY SIDE OF THINGS: AS MUCH AS I ALWAYS COULD
THINGS HAVE BEEN GETTING ON TOP OF ME: NO, MOST OF THE TIME I HAVE COPED QUITE WELL
TOTAL SCORE: 4
I HAVE FELT SAD OR MISERABLE: NO, NOT AT ALL
I HAVE BLAMED MYSELF UNNECESSARILY WHEN THINGS WENT WRONG: NO, NEVER
I HAVE LOOKED FORWARD WITH ENJOYMENT TO THINGS: AS MUCH AS I EVER DID
THE THOUGHT OF HARMING MYSELF HAS OCCURRED TO ME: NEVER
I HAVE BEEN SO UNHAPPY THAT I HAVE BEEN CRYING: NO, NEVER

## 2025-05-23 ASSESSMENT — PATIENT HEALTH QUESTIONNAIRE - PHQ9
SUM OF ALL RESPONSES TO PHQ9 QUESTIONS 1 AND 2: 0
2. FEELING DOWN, DEPRESSED OR HOPELESS: NOT AT ALL
1. LITTLE INTEREST OR PLEASURE IN DOING THINGS: NOT AT ALL

## 2025-05-23 ASSESSMENT — COLUMBIA-SUICIDE SEVERITY RATING SCALE - C-SSRS
2. HAVE YOU ACTUALLY HAD ANY THOUGHTS OF KILLING YOURSELF?: NO
6. HAVE YOU EVER DONE ANYTHING, STARTED TO DO ANYTHING, OR PREPARED TO DO ANYTHING TO END YOUR LIFE?: NO
1. IN THE PAST MONTH, HAVE YOU WISHED YOU WERE DEAD OR WISHED YOU COULD GO TO SLEEP AND NOT WAKE UP?: NO

## 2025-05-24 LAB
HBV CORE AB SERPL QL IA: NORMAL
HBV SURFACE AB SERPL IA-ACNC: 5 MIU/ML
HBV SURFACE AG SERPL QL IA: NORMAL
HCV AB SERPL QL IA: NORMAL
HIV 1+2 AB+HIV1 P24 AG SERPL QL IA: NORMAL
RUBV IGG SERPL IA-ACNC: NORMAL
T PALLIDUM AB SER QL IA: NORMAL

## 2025-05-25 LAB
BACTERIA UR CULT: NORMAL
BACTERIA UR CULT: NORMAL
C TRACH RRNA SPEC QL NAA+PROBE: NOT DETECTED
C TRACH RRNA SPEC QL NAA+PROBE: NOT DETECTED
N GONORRHOEA RRNA SPEC QL NAA+PROBE: NOT DETECTED
N GONORRHOEA RRNA SPEC QL NAA+PROBE: NOT DETECTED
QUEST GC CT AMPLIFIED (ALWAYS MESSAGE): NORMAL
QUEST GC CT AMPLIFIED (ALWAYS MESSAGE): NORMAL
T VAGINALIS RRNA SPEC QL NAA+PROBE: NOT DETECTED
T VAGINALIS RRNA SPEC QL NAA+PROBE: NOT DETECTED

## 2025-05-27 LAB
HEMOGLOBIN A2: 3.1 % (ref 2–3.5)
HEMOGLOBIN A: 96.5 % (ref 95.8–98)
HEMOGLOBIN F: 0.4 % (ref 0–2)
HEMOGLOBIN IDENTIFICATION INTERPRETATION: NORMAL
PATH REVIEW-HGB IDENTIFICATION: NORMAL

## 2025-05-28 LAB
HBV CORE AB SERPL QL IA: NORMAL
HBV SURFACE AB SERPL IA-ACNC: 5 MIU/ML
HBV SURFACE AG SERPL QL IA: NORMAL
HCV AB SERPL QL IA: NORMAL
HIV 1+2 AB+HIV1 P24 AG SERPL QL IA: NORMAL
RUBV IGG SERPL IA-ACNC: 1.88 INDEX
T PALLIDUM AB SER QL IA: NEGATIVE

## 2025-06-10 PROBLEM — F32.1 CURRENT MODERATE EPISODE OF MAJOR DEPRESSIVE DISORDER (MULTI): Status: RESOLVED | Noted: 2024-12-10 | Resolved: 2025-06-10

## 2025-06-10 PROBLEM — Z34.90 PREGNANCY (HHS-HCC): Status: RESOLVED | Noted: 2025-05-23 | Resolved: 2025-06-10

## 2025-06-11 ENCOUNTER — APPOINTMENT (OUTPATIENT)
Dept: LAB | Facility: HOSPITAL | Age: 30
End: 2025-06-11
Payer: COMMERCIAL

## 2025-06-11 ENCOUNTER — APPOINTMENT (OUTPATIENT)
Dept: OBSTETRICS AND GYNECOLOGY | Facility: CLINIC | Age: 30
End: 2025-06-11
Payer: COMMERCIAL

## 2025-06-11 ENCOUNTER — INITIAL PRENATAL (OUTPATIENT)
Dept: OBSTETRICS AND GYNECOLOGY | Facility: CLINIC | Age: 30
End: 2025-06-11
Payer: COMMERCIAL

## 2025-06-11 ENCOUNTER — HOSPITAL ENCOUNTER (OUTPATIENT)
Dept: RADIOLOGY | Facility: CLINIC | Age: 30
Discharge: HOME | End: 2025-06-11
Payer: COMMERCIAL

## 2025-06-11 VITALS — BODY MASS INDEX: 21.35 KG/M2 | DIASTOLIC BLOOD PRESSURE: 55 MMHG | SYSTOLIC BLOOD PRESSURE: 87 MMHG | WEIGHT: 122 LBS

## 2025-06-11 DIAGNOSIS — Z34.90 ENCOUNTER FOR PREGNANCY RELATED EXAMINATION, ANTEPARTUM: Primary | ICD-10-CM

## 2025-06-11 DIAGNOSIS — Z34.90 PREGNANCY, UNSPECIFIED GESTATIONAL AGE (HHS-HCC): ICD-10-CM

## 2025-06-11 PROCEDURE — 0500F INITIAL PRENATAL CARE VISIT: CPT | Performed by: OBSTETRICS & GYNECOLOGY

## 2025-06-11 PROCEDURE — 76813 OB US NUCHAL MEAS 1 GEST: CPT

## 2025-06-11 NOTE — PROGRESS NOTES
Ob Follow-up    SUBJECTIVE    HPI: Cara Quezada is a 30 y.o.  at 12w1d here for RPNV.  She denies vaginal bleeding, leakage of fluid, decreased fetal movements, and contractions.     OBJECTIVE  Visit Vitals  BP 87/55   Wt 55.3 kg (122 lb)   LMP 2025 (Exact Date)   BMI 21.35 kg/m²   OB Status Pregnant   Smoking Status Never   BSA 1.57 m²      See OB flowsheet      ASSESSMENT & PLAN    Cara Quezada is a 30 y.o.  at 12w1d here for the following concerns we addressed today:    Problem List Items Addressed This Visit          Ob-Gyn Problems    Encounter for pregnancy related examination, antepartum - Primary    Overview     [x] Accepts Blood Products: Yes  [x] Initial BMI: 21  [x] Prenatal Labs: Normal  [x] Last pap: 10/2023 Negative  [] Genetic Screening:  cfDNA screen  [] Fetal Sex:   [x] Baby ASA: Yes  [x] Pregnancy dated by:  LMP c/w 12w US    [] Anatomy US:   [] 1hr GCT at 24-28wks:  [x] Rhogam: O Positive  [] Tdap Vaccine:  [] RSV (32-36 wks Sep-):   [] Flu Vaccine:    [] GBS at 36 - 37 wks:  [] Labor preferences:  [] 39 weeks discussion of IOL vs. Expectant management:  [x] Mode of delivery: Vaginal   [] Breastfeeding:  [] Postpartum Birth control method:                    Relevant Orders    [facility] Prenatal Screen         Has not had genetic screening, ordered today  US today, FHTs 159  RTC in 4 weeks      Mike Masters MD

## 2025-06-24 LAB
COMMENTS - MP RESULT TYPE: NORMAL
SCAN RESULT: NORMAL

## 2025-06-27 ENCOUNTER — APPOINTMENT (OUTPATIENT)
Dept: OBSTETRICS AND GYNECOLOGY | Facility: CLINIC | Age: 30
End: 2025-06-27
Payer: COMMERCIAL

## 2025-07-03 ENCOUNTER — APPOINTMENT (OUTPATIENT)
Dept: PRIMARY CARE | Facility: CLINIC | Age: 30
End: 2025-07-03
Payer: COMMERCIAL

## 2025-07-03 NOTE — PROGRESS NOTES
Subjective   Patient ID: Cara Quezada is a 30 y.o. female who presents for No chief complaint on file..  History of Present Illness    30-year-old female here for follow-up visit and form completion, last seen for preventive care the visit in December.    2/25: [facility] made 3 out reach attempts.  Pt never responded sent a message in lab results   Labs fine.     PMHx:   - Depression/anxiety  -in the setting of multiple life stressors referred to behavioral health, component of seasonal affective disorder  - Psoriasis - stress induced uses topical tx has an appointment this week with dermatology,   - Early family history of MI - father at 53, maternal uncle in 40s      Social:   - Lives with  and 1 year old son Sanchez   - Remote social hookah, no additional tobacco, no alcohol, no illicits  - Studied environmental science   - 's family in [place] and [place], majority of family in [facility] cousin in [place]       PMHx, FHx, Social Hx, Surg Hx personally reviewed at this appointment. No pertinent findings and/or changes from prior (if applicable).      Objective     LMP 03/18/2025 (Exact Date)        Lab Results   Component Value Date    WBC 8.4 05/23/2025    HGB 13.1 05/23/2025    HCT 38.5 05/23/2025     05/23/2025    CHOL 200 (H) 03/18/2025    TRIG 80 03/18/2025    HDL 78 03/18/2025    ALT 11 03/18/2025    AST 15 03/18/2025     03/18/2025    K 4.0 03/18/2025     03/18/2025    CREATININE 0.53 03/18/2025    BUN 13 03/18/2025    CO2 27 03/18/2025    TSH 0.85 03/18/2025    HGBA1C 4.9 03/18/2025         Current Outpatient Medications   Medication Instructions    aspirin 162 mg, oral, Daily    PRENATAL 2-IRON-FOLIC ACID-OM3 ORAL Take by mouth.        US OB NT (NUCHAL translucency)  Interpreted by: Jalen Ybarra  Indication  ========    Nuchal Translucency Screening  History  ======    General History  Smoking: No  Height 160 cm  Height (ft) 5 ft  Height (in) 3 in  Previous  Outcomes   2  Para 0  Children born living ?37w 1  Pregnancies delivered at term (T) 1  Living children (L) 1  Other: Vaginal Delivery  Maternal Assessment  =================    Height 160 cm  Height (ft) 5 ft  Height (in) 3 in  Weight 56 kg  Weight (lb) 123 lb  Weight gain 0 kg  Weight gain (lb) 0 lb  BMI 21.79 kg/mï¿½  Physical Exam  Initial weight (lb) 123 lb  Pregnancy  =========    Syed pregnancy. Number of fetuses: 1  Dating  ======    LMP on: 3/18/2025  Cycle: Very certain LMP, regular cycle  GA by LMP 12 w + 1 d  LANIE by LMP: 2025  Conception: LMP  Ultrasound examination on: 2025  GA by U/S based upon: CRL  GA by U/S 12 w + 0 d  LANIE by U/S: 2025  Assigned: based on the LMP, selected on 2025  Assigned GA 12 w + 1 d  Assigned LANIE: 2025  Impression  =========    A first trimester anatomic survey is being performed. The purpose of this exam is to screen for fetal malformations reliably detected in the first trimester.    - Syed fetus with cardiac activity is seen  - Crown rump length is consistent with established LANIE  - Fetal organ survey is within normal limits for the gestational age  - Nuchal translucency is within normal limits measuring 2.2 mm  - Placenta & adnexa within normal limits    cfDNA aneuploidy screening (planned) does not screen for malformations, atypical aneuploidies and genetic syndromes; therefore, a first trimester anatomic survey with NT  assessment was performed and noted to be within normal limits. A first trimester anatomic evaluation cannot exclude all fetal malformations and a second trimester  anatomic survey is recommended. This study has been scheduled at 19+ weeks.    Thank you for allowing us to participate in the care of your patient.  Follow-up  ========    A repeat evaluation has been scheduled at 19 weeks.  General Evaluation  ==============    Cardiac activity present  Placenta: appears appropriate for gestational age  Cord  vessels: not visualized  Amniotic fluid: normal by qualitative assessment  Fetal Biometry  ============    Standard   bpm  CRL 57.2 mm 12w 0d 42% Pexsters  NT 2.20 mm  Fetal Anatomy  ===========    Heart: Normal Axis and Situs. Stomach: Visible, with correct situs. Bladder: Visible. Arms: Both Upper Extremities Seen. Legs: Both Lower Extremities Seen.    The following structures appear normal:  Cranium. Face: Nasal Bone Present. Neck. Abdominal wall: Normal abdominal cord insertion.  Maternal Structures  ===============    Uterus / Cervix  Uterus: Visualized  Uterus position: anteverted  Cervix: Visualized  Cervix details: Normal  Ovaries / Tubes / Adnexa  Rt ovary: Visualized  Rt ovary details: Normal  Rt ovary morphology: normal  Rt ovary D1 38.9 mm  Rt ovary D2 28.9 mm  Rt ovary D3 32.8 mm  Rt ovary Vol 19.3 cmï¿½  Lt ovary: Visualized  Lt ovary details: Normal  Lt ovary morphology: normal  Lt ovary D1 29.6 mm  Lt ovary D2 20.8 mm  Lt ovary D3 14.3 mm  Lt ovary Vol 4.6 cmï¿½  Cul de Sac / Bladder / Kidneys / Other  Cul de Sac: Visualized  Free fluid: No free fluid visualized           Assessment & Plan      Health Maintenance  Cancer screening:   - Pap 10/23 Dr. Masters   Immunizations:         Cynthia Nunez DO       This medical note was created with the assistance of artificial intelligence (AI) for documentation purposes. The content has been reviewed and confirmed by the healthcare provider for accuracy and completeness. Patient consented to the use of audio recording and use of AI during their visit.

## 2025-07-08 ENCOUNTER — OFFICE VISIT (OUTPATIENT)
Dept: PRIMARY CARE | Facility: CLINIC | Age: 30
End: 2025-07-08
Payer: COMMERCIAL

## 2025-07-08 ENCOUNTER — APPOINTMENT (OUTPATIENT)
Dept: OBSTETRICS AND GYNECOLOGY | Facility: CLINIC | Age: 30
End: 2025-07-08
Payer: COMMERCIAL

## 2025-07-08 VITALS
SYSTOLIC BLOOD PRESSURE: 90 MMHG | DIASTOLIC BLOOD PRESSURE: 57 MMHG | WEIGHT: 125.2 LBS | BODY MASS INDEX: 22.18 KG/M2 | HEIGHT: 63 IN | TEMPERATURE: 97.2 F | HEART RATE: 55 BPM

## 2025-07-08 DIAGNOSIS — F32.A ANXIETY AND DEPRESSION: Primary | ICD-10-CM

## 2025-07-08 DIAGNOSIS — F41.9 ANXIETY AND DEPRESSION: Primary | ICD-10-CM

## 2025-07-08 DIAGNOSIS — Z02.89 ENCOUNTER FOR COMPLETION OF FORM WITH PATIENT: ICD-10-CM

## 2025-07-08 DIAGNOSIS — G56.00 CARPAL TUNNEL SYNDROME, UNSPECIFIED LATERALITY: ICD-10-CM

## 2025-07-08 PROCEDURE — 99213 OFFICE O/P EST LOW 20 MIN: CPT | Performed by: INTERNAL MEDICINE

## 2025-07-08 PROCEDURE — 3008F BODY MASS INDEX DOCD: CPT | Performed by: INTERNAL MEDICINE

## 2025-07-08 PROCEDURE — 1036F TOBACCO NON-USER: CPT | Performed by: INTERNAL MEDICINE

## 2025-07-08 ASSESSMENT — PAIN SCALES - GENERAL: PAINLEVEL_OUTOF10: 0-NO PAIN

## 2025-07-08 NOTE — PATIENT INSTRUCTIONS
Nofar,   Purchase a neutral wrist splint to be used at night   Psychology today website   [email or web address]

## 2025-07-08 NOTE — PROGRESS NOTES
Subjective   Patient ID: Cara Quezada is a 30 y.o. female who presents for Follow-up.  History of Present Illness  Cara Quezada is a 30 year old female who presents with pregnancy-related concerns and numbness in her hands and form completion.     She is currently pregnant and discovered her pregnancy while in [place]. She feels tired but has not experienced nausea. She is considering returning to work as a  at [employer].     Numbness in her hands occurs particularly during workouts, affecting her ability to perform exercises involving her hands. The numbness is described as annoying, with no associated weakness.    She underwent extensive blood tests during her pregnancy, including tests for hepatitis B, syphilis, rubella, trichomonas, and HIV. Her blood work from 2023 was normal.    She is currently taking prenatal vitamins, vitamin D (5,000 units), and aspirin. She started vitamin D supplementation while breastfeeding and continues to take it during her pregnancy.    She feels anxious and has experienced a difficult year, including the loss of a cousin and the war in [place]. No depression or thoughts of self-harm, but she feels isolated at home and believes returning to work might improve her mood.      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 No pertinent  "findings and/or changes from prior (if applicable).      Objective     BP 90/57   Pulse 55   Temp 36.2 °C (97.2 °F) (Temporal)   Ht 1.61 m (5' 3.39\")   Wt 56.8 kg (125 lb 3.2 oz)   LMP 2025 (Exact Date)   BMI 21.91 kg/m²    General: Alert and oriented, in no apparent distress   HEENT: No conjunctival erythema, no external facial lesions   Lungs: Breathing comfortably  Skin: No evidence of skin breakdown.  Neuro: AAO x 3, answering questions appropriately, no obvious cranial nerve deficits   Hands: reproducible numbness, thenar eminence intact without e/o wasting.    Lab Results   Component Value Date    WBC 8.4 2025    HGB 13.1 2025    HCT 38.5 2025     2025    CHOL 200 (H) 2025    TRIG 80 2025    HDL 78 2025    ALT 11 2025    AST 15 2025     2025    K 4.0 2025     2025    CREATININE 0.53 2025    BUN 13 2025    CO2 27 2025    TSH 0.85 2025    HGBA1C 4.9 2025         Current Outpatient Medications   Medication Instructions    aspirin 162 mg, oral, Daily    PRENATAL 2-IRON-FOLIC ACID-OM3 ORAL Take by mouth.        US OB NT (NUCHAL translucency)  Interpreted by: Jalen Ybarra  Indication  ========    Nuchal Translucency Screening  History  ======    General History  Smoking: No  Height 160 cm  Height (ft) 5 ft  Height (in) 3 in  Previous Outcomes   2  Para 0  Children born living ?37w 1  Pregnancies delivered at term (T) 1  Living children (L) 1  Other: Vaginal Delivery  Maternal Assessment  =================    Height 160 cm  Height (ft) 5 ft  Height (in) 3 in  Weight 56 kg  Weight (lb) 123 lb  Weight gain 0 kg  Weight gain (lb) 0 lb  BMI 21.79 kg/mï¿½  Physical Exam  Initial weight (lb) 123 lb  Pregnancy  =========    Syed pregnancy. Number of fetuses: 1  Dating  ======    LMP on: 3/18/2025  Cycle: Very certain LMP, regular cycle  GA by LMP 12 w + 1 d  LANIE by " LMP: 12/23/2025  Conception: LMP  Ultrasound examination on: 6/11/2025  GA by U/S based upon: CRL  GA by U/S 12 w + 0 d  LANIE by U/S: 12/24/2025  Assigned: based on the LMP, selected on 06/11/2025  Assigned GA 12 w + 1 d  Assigned LANIE: 12/23/2025  Impression  =========    A first trimester anatomic survey is being performed. The purpose of this exam is to screen for fetal malformations reliably detected in the first trimester.    - Syed fetus with cardiac activity is seen  - Crown rump length is consistent with established LANIE  - Fetal organ survey is within normal limits for the gestational age  - Nuchal translucency is within normal limits measuring 2.2 mm  - Placenta & adnexa within normal limits    cfDNA aneuploidy screening (planned) does not screen for malformations, atypical aneuploidies and genetic syndromes; therefore, a first trimester anatomic survey with NT  assessment was performed and noted to be within normal limits. A first trimester anatomic evaluation cannot exclude all fetal malformations and a second trimester  anatomic survey is recommended. This study has been scheduled at 19+ weeks.    Thank you for allowing us to participate in the care of your patient.  Follow-up  ========    A repeat evaluation has been scheduled at 19 weeks.  General Evaluation  ==============    Cardiac activity present  Placenta: appears appropriate for gestational age  Cord vessels: not visualized  Amniotic fluid: normal by qualitative assessment  Fetal Biometry  ============    Standard   bpm  CRL 57.2 mm 12w 0d 42% Pexsters  NT 2.20 mm  Fetal Anatomy  ===========    Heart: Normal Axis and Situs. Stomach: Visible, with correct situs. Bladder: Visible. Arms: Both Upper Extremities Seen. Legs: Both Lower Extremities Seen.    The following structures appear normal:  Cranium. Face: Nasal Bone Present. Neck. Abdominal wall: Normal abdominal cord insertion.  Maternal Structures  ===============    Uterus /  Cervix  Uterus: Visualized  Uterus position: anteverted  Cervix: Visualized  Cervix details: Normal  Ovaries / Tubes / Adnexa  Rt ovary: Visualized  Rt ovary details: Normal  Rt ovary morphology: normal  Rt ovary D1 38.9 mm  Rt ovary D2 28.9 mm  Rt ovary D3 32.8 mm  Rt ovary Vol 19.3 cmï¿½  Lt ovary: Visualized  Lt ovary details: Normal  Lt ovary morphology: normal  Lt ovary D1 29.6 mm  Lt ovary D2 20.8 mm  Lt ovary D3 14.3 mm  Lt ovary Vol 4.6 cmï¿½  Cul de Sac / Bladder / Kidneys / Other  Cul de Sac: Visualized  Free fluid: No free fluid visualized           Assessment & Plan  Pregnancy  Currently pregnant with expected delivery in December. Advised to reduce vitamin D dose. Continuing prenatal vitamins and aspirin as recommended by OB   - Continue prenatal vitamins and aspirin.  - Reduce vitamin D to 1,000-2,000 IU daily.    Carpal Tunnel Syndrome  Numbness in hands likely due to fluid retention from pregnancy and overuse compressing the median nerve. Advised use of a neutral wrist splint at night.  - Use a neutral wrist splint at night.  - Avoid repetitive hand movements during workouts.  - Consider alternative exercises.    Mental Health Concerns  Reports anxiety, denies depression or suicidal thoughts. Previously uncomfortable with male therapist. Discussed importance of support during pregnancy.  - Explore therapy options on Psychology Today.  - Consider finding a female therapist.  - Seek in-person therapy.    General Health Maintenance  Not immune to hepatitis B; will consider vaccination post-pregnancy. Immunity confirmed for rubella, varicella, and mumps. TB screen in the past negative no current symptoms   - Consider hepatitis B vaccination after pregnancy.  Form completed     Health Maintenance  Cancer screening:   - Pap 10/23 Dr. Sonam Nunez,        This medical note was created with the assistance of artificial intelligence (AI) for documentation purposes. The content has been  reviewed and confirmed by the healthcare provider for accuracy and completeness. Patient consented to the use of audio recording and use of AI during their visit.

## 2025-07-10 ENCOUNTER — APPOINTMENT (OUTPATIENT)
Dept: OBSTETRICS AND GYNECOLOGY | Facility: CLINIC | Age: 30
End: 2025-07-10
Payer: COMMERCIAL

## 2025-07-10 VITALS — SYSTOLIC BLOOD PRESSURE: 105 MMHG | DIASTOLIC BLOOD PRESSURE: 67 MMHG | BODY MASS INDEX: 22.4 KG/M2 | WEIGHT: 128 LBS

## 2025-07-10 DIAGNOSIS — Z34.90 ENCOUNTER FOR PREGNANCY RELATED EXAMINATION, ANTEPARTUM: Primary | ICD-10-CM

## 2025-07-10 PROCEDURE — 0501F PRENATAL FLOW SHEET: CPT | Performed by: OBSTETRICS & GYNECOLOGY

## 2025-07-10 NOTE — PROGRESS NOTES
Ob Follow-up    SUBJECTIVE      HPI: Cara Quezada is a 30 y.o.  at 16w2d here for RPNV.  She denies vaginal bleeding, leakage of fluid, decreased fetal movements, and contractions.       OBJECTIVE  Visit Vitals  /67   Wt 58.1 kg (128 lb)   LMP 2025 (Exact Date)   BMI 22.40 kg/m²   OB Status Pregnant   Smoking Status Never   BSA 1.61 m²      See OB flowsheet      ASSESSMENT & PLAN    Cara Quezada is a 30 y.o.  at 16w2d here for the following concerns we addressed today:    Problem List Items Addressed This Visit          Ob-Gyn Problems    Encounter for pregnancy related examination, antepartum - Primary    Overview     [x] Accepts Blood Products: Yes  [x] Initial BMI: 21  [x] Prenatal Labs: Normal  [x] Last pap: 10/2023 Negative  [x] Genetic Screening:  rrcfDNA screen 46XX  [x] Fetal Sex: It's a girl!  [x] Baby ASA: Yes  [x] Pregnancy dated by:  LMP c/w 12w US    [] Anatomy US:   [] 1hr GCT at 24-28wks:  [x] Rhogam: O Positive  [] Tdap Vaccine:  [] RSV (32-36 wks Sep-):   [] Flu Vaccine:    [] GBS at 36 - 37 wks:  [] Labor preferences:  [] 39 weeks discussion of IOL vs. Expectant management:  [x] Mode of delivery: Vaginal   [] Breastfeeding:  [] Postpartum Birth control method:                         No orders of the defined types were placed in this encounter.     Anatomy US scheduled  RTC in 4 weeks      Mike Mastres MD

## 2025-07-29 ENCOUNTER — HOSPITAL ENCOUNTER (OUTPATIENT)
Dept: RADIOLOGY | Facility: CLINIC | Age: 30
Discharge: HOME | End: 2025-07-29
Payer: COMMERCIAL

## 2025-07-29 DIAGNOSIS — O35.9XX0 MATERNAL CARE FOR (SUSPECTED) FETAL ABNORMALITY AND DAMAGE, UNSPECIFIED, NOT APPLICABLE OR UNSPECIFIED: ICD-10-CM

## 2025-07-29 DIAGNOSIS — Z34.90 PREGNANCY, UNSPECIFIED GESTATIONAL AGE (HHS-HCC): ICD-10-CM

## 2025-07-29 PROCEDURE — 76805 OB US >/= 14 WKS SNGL FETUS: CPT | Performed by: STUDENT IN AN ORGANIZED HEALTH CARE EDUCATION/TRAINING PROGRAM

## 2025-07-29 PROCEDURE — 76805 OB US >/= 14 WKS SNGL FETUS: CPT

## 2025-07-31 ENCOUNTER — APPOINTMENT (OUTPATIENT)
Dept: PRIMARY CARE | Facility: CLINIC | Age: 30
End: 2025-07-31
Payer: COMMERCIAL

## 2025-08-04 ENCOUNTER — ROUTINE PRENATAL (OUTPATIENT)
Dept: OBSTETRICS AND GYNECOLOGY | Facility: CLINIC | Age: 30
End: 2025-08-04
Payer: COMMERCIAL

## 2025-08-04 VITALS — DIASTOLIC BLOOD PRESSURE: 61 MMHG | BODY MASS INDEX: 23.27 KG/M2 | WEIGHT: 133 LBS | SYSTOLIC BLOOD PRESSURE: 98 MMHG

## 2025-08-04 DIAGNOSIS — Z34.90 ENCOUNTER FOR PREGNANCY RELATED EXAMINATION, ANTEPARTUM: Primary | ICD-10-CM

## 2025-08-04 PROCEDURE — 0501F PRENATAL FLOW SHEET: CPT | Performed by: ADVANCED PRACTICE MIDWIFE

## 2025-08-04 ASSESSMENT — ENCOUNTER SYMPTOMS
MUSCULOSKELETAL NEGATIVE: 0
HEMATOLOGIC/LYMPHATIC NEGATIVE: 0
EYES NEGATIVE: 0
RESPIRATORY NEGATIVE: 0
ENDOCRINE NEGATIVE: 0
GASTROINTESTINAL NEGATIVE: 0
ALLERGIC/IMMUNOLOGIC NEGATIVE: 0
NEUROLOGICAL NEGATIVE: 0
PSYCHIATRIC NEGATIVE: 0
CARDIOVASCULAR NEGATIVE: 0
CONSTITUTIONAL NEGATIVE: 0

## 2025-08-04 NOTE — PROGRESS NOTES
Ob Follow-up  25     SUBJECTIVE    HPI: Cara Quezada is a 30 y.o.  at 19w6d here for RPNV.  She denies LOF, VB, contractions. Feeling movement everyday.     Starting to experience heartburn, gets decent relief with Tums. Has also noticed some nosebleeds.   Also endorses some numbness in wrist and hand, some relief with wearing wrist brace.      OBJECTIVE  Visit Vitals  BP 98/61   Wt 60.3 kg (133 lb)   LMP 2025 (Exact Date)   BMI 23.27 kg/m²   OB Status Pregnant   Smoking Status Never   BSA 1.64 m²      ASSESSMENT & PLAN    Cara Quezada is a 30 y.o.  at 19w6d here for the following concerns we addressed today:    Problem List Items Addressed This Visit       Encounter for pregnancy related examination, antepartum - Primary    Overview     [x] Accepts Blood Products: Yes  [x] Initial BMI: 21  [x] Prenatal Labs: Normal  [x] Last pap: 10/2023 Negative  [x] Genetic Screening:  rrcfDNA screen 46XX  [x] Fetal Sex: It's a girl!  [x] Baby ASA: Yes  [x] Pregnancy dated by:  LMP c/w 12w US    [] Anatomy US:   [] 1hr GCT at 24-28wks:  [x] Rhogam: O Positive  [] Tdap Vaccine:  [] RSV (32-36 wks Sep-):   [] Flu Vaccine:    [] GBS at 36 - 37 wks:  [] Labor preferences:  [] 39 weeks discussion of IOL vs. Expectant management:  [x] Mode of delivery: Vaginal   [] Breastfeeding:  [] Postpartum Birth control method:                         No orders of the defined types were placed in this encounter.  Anatomy US results reviewed, wnl.   Return precautions reviewed.   RTC in 4 weeks.      Alea Jackson, AGNES-ISABEL

## 2025-09-04 ENCOUNTER — APPOINTMENT (OUTPATIENT)
Dept: OBSTETRICS AND GYNECOLOGY | Facility: CLINIC | Age: 30
End: 2025-09-04
Payer: COMMERCIAL

## 2025-09-04 ASSESSMENT — ENCOUNTER SYMPTOMS
CARDIOVASCULAR NEGATIVE: 0
CONSTITUTIONAL NEGATIVE: 0
ALLERGIC/IMMUNOLOGIC NEGATIVE: 0
ENDOCRINE NEGATIVE: 0
RESPIRATORY NEGATIVE: 0
EYES NEGATIVE: 0
MUSCULOSKELETAL NEGATIVE: 0
NEUROLOGICAL NEGATIVE: 0
GASTROINTESTINAL NEGATIVE: 0
PSYCHIATRIC NEGATIVE: 0
HEMATOLOGIC/LYMPHATIC NEGATIVE: 0

## 2025-09-30 ENCOUNTER — APPOINTMENT (OUTPATIENT)
Dept: OBSTETRICS AND GYNECOLOGY | Facility: CLINIC | Age: 30
End: 2025-09-30
Payer: COMMERCIAL

## 2025-10-15 ENCOUNTER — APPOINTMENT (OUTPATIENT)
Dept: OBSTETRICS AND GYNECOLOGY | Facility: CLINIC | Age: 30
End: 2025-10-15
Payer: COMMERCIAL

## 2025-10-29 ENCOUNTER — APPOINTMENT (OUTPATIENT)
Dept: OBSTETRICS AND GYNECOLOGY | Facility: CLINIC | Age: 30
End: 2025-10-29
Payer: COMMERCIAL

## 2025-11-12 ENCOUNTER — APPOINTMENT (OUTPATIENT)
Dept: OBSTETRICS AND GYNECOLOGY | Facility: CLINIC | Age: 30
End: 2025-11-12
Payer: COMMERCIAL

## 2025-11-25 ENCOUNTER — APPOINTMENT (OUTPATIENT)
Dept: OBSTETRICS AND GYNECOLOGY | Facility: CLINIC | Age: 30
End: 2025-11-25
Payer: COMMERCIAL

## 2025-12-04 ENCOUNTER — APPOINTMENT (OUTPATIENT)
Dept: OBSTETRICS AND GYNECOLOGY | Facility: CLINIC | Age: 30
End: 2025-12-04
Payer: COMMERCIAL

## 2025-12-11 ENCOUNTER — APPOINTMENT (OUTPATIENT)
Dept: OBSTETRICS AND GYNECOLOGY | Facility: CLINIC | Age: 30
End: 2025-12-11
Payer: COMMERCIAL

## 2025-12-18 ENCOUNTER — APPOINTMENT (OUTPATIENT)
Dept: OBSTETRICS AND GYNECOLOGY | Facility: CLINIC | Age: 30
End: 2025-12-18
Payer: COMMERCIAL

## 2025-12-23 ENCOUNTER — APPOINTMENT (OUTPATIENT)
Dept: OBSTETRICS AND GYNECOLOGY | Facility: CLINIC | Age: 30
End: 2025-12-23
Payer: COMMERCIAL

## 2025-12-30 ENCOUNTER — APPOINTMENT (OUTPATIENT)
Dept: OBSTETRICS AND GYNECOLOGY | Facility: CLINIC | Age: 30
End: 2025-12-30
Payer: COMMERCIAL